# Patient Record
Sex: MALE | Race: WHITE | NOT HISPANIC OR LATINO | Employment: UNEMPLOYED | URBAN - METROPOLITAN AREA
[De-identification: names, ages, dates, MRNs, and addresses within clinical notes are randomized per-mention and may not be internally consistent; named-entity substitution may affect disease eponyms.]

---

## 2019-04-03 ENCOUNTER — HOSPITAL ENCOUNTER (EMERGENCY)
Facility: HOSPITAL | Age: 29
Discharge: PSYCHIATRIC OR SUBSTANCE USE FACILITY | End: 2019-04-04
Attending: EMERGENCY MEDICINE
Payer: MEDICAID

## 2019-04-03 DIAGNOSIS — T50.904S DRUG OVERDOSE, UNDETERMINED INTENT, SEQUELA: Primary | ICD-10-CM

## 2019-04-03 PROCEDURE — 99284 EMERGENCY DEPT VISIT MOD MDM: CPT | Mod: ,,, | Performed by: EMERGENCY MEDICINE

## 2019-04-03 PROCEDURE — 99285 EMERGENCY DEPT VISIT HI MDM: CPT

## 2019-04-03 PROCEDURE — 99284 PR EMERGENCY DEPT VISIT,LEVEL IV: ICD-10-PCS | Mod: ,,, | Performed by: EMERGENCY MEDICINE

## 2019-04-04 VITALS
SYSTOLIC BLOOD PRESSURE: 112 MMHG | HEART RATE: 63 BPM | OXYGEN SATURATION: 100 % | TEMPERATURE: 98 F | HEIGHT: 75 IN | RESPIRATION RATE: 16 BRPM | WEIGHT: 165 LBS | DIASTOLIC BLOOD PRESSURE: 63 MMHG | BODY MASS INDEX: 20.51 KG/M2

## 2019-04-04 PROBLEM — T40.601A OPIATE OVERDOSE: Status: ACTIVE | Noted: 2019-04-04

## 2019-04-04 LAB
ALBUMIN SERPL BCP-MCNC: 3.8 G/DL (ref 3.5–5.2)
ALP SERPL-CCNC: 104 U/L (ref 55–135)
ALT SERPL W/O P-5'-P-CCNC: 10 U/L (ref 10–44)
AMPHET+METHAMPHET UR QL: NEGATIVE
ANION GAP SERPL CALC-SCNC: 8 MMOL/L (ref 8–16)
APAP SERPL-MCNC: <3 UG/ML (ref 10–20)
AST SERPL-CCNC: 14 U/L (ref 10–40)
BACTERIA #/AREA URNS AUTO: ABNORMAL /HPF
BARBITURATES UR QL SCN>200 NG/ML: NEGATIVE
BASOPHILS # BLD AUTO: 0.04 K/UL (ref 0–0.2)
BASOPHILS NFR BLD: 0.3 % (ref 0–1.9)
BENZODIAZ UR QL SCN>200 NG/ML: NEGATIVE
BILIRUB SERPL-MCNC: 0.3 MG/DL (ref 0.1–1)
BILIRUB UR QL STRIP: NEGATIVE
BUN SERPL-MCNC: 14 MG/DL (ref 6–20)
BZE UR QL SCN: NEGATIVE
CALCIUM SERPL-MCNC: 8.8 MG/DL (ref 8.7–10.5)
CANNABINOIDS UR QL SCN: NORMAL
CHLORIDE SERPL-SCNC: 102 MMOL/L (ref 95–110)
CLARITY UR REFRACT.AUTO: CLEAR
CO2 SERPL-SCNC: 24 MMOL/L (ref 23–29)
COLOR UR AUTO: YELLOW
CREAT SERPL-MCNC: 0.9 MG/DL (ref 0.5–1.4)
CREAT UR-MCNC: 322 MG/DL (ref 23–375)
DIFFERENTIAL METHOD: ABNORMAL
EOSINOPHIL # BLD AUTO: 0.1 K/UL (ref 0–0.5)
EOSINOPHIL NFR BLD: 0.4 % (ref 0–8)
ERYTHROCYTE [DISTWIDTH] IN BLOOD BY AUTOMATED COUNT: 12.2 % (ref 11.5–14.5)
EST. GFR  (AFRICAN AMERICAN): >60 ML/MIN/1.73 M^2
EST. GFR  (NON AFRICAN AMERICAN): >60 ML/MIN/1.73 M^2
ETHANOL SERPL-MCNC: <10 MG/DL
GLUCOSE SERPL-MCNC: 99 MG/DL (ref 70–110)
GLUCOSE UR QL STRIP: NEGATIVE
HCT VFR BLD AUTO: 31.8 % (ref 40–54)
HGB BLD-MCNC: 10.5 G/DL (ref 14–18)
HGB UR QL STRIP: NEGATIVE
IMM GRANULOCYTES # BLD AUTO: 0.04 K/UL (ref 0–0.04)
IMM GRANULOCYTES NFR BLD AUTO: 0.3 % (ref 0–0.5)
KETONES UR QL STRIP: ABNORMAL
LEUKOCYTE ESTERASE UR QL STRIP: NEGATIVE
LYMPHOCYTES # BLD AUTO: 3 K/UL (ref 1–4.8)
LYMPHOCYTES NFR BLD: 24.4 % (ref 18–48)
MCH RBC QN AUTO: 31 PG (ref 27–31)
MCHC RBC AUTO-ENTMCNC: 33 G/DL (ref 32–36)
MCV RBC AUTO: 94 FL (ref 82–98)
METHADONE UR QL SCN>300 NG/ML: NEGATIVE
MICROSCOPIC COMMENT: ABNORMAL
MONOCYTES # BLD AUTO: 0.8 K/UL (ref 0.3–1)
MONOCYTES NFR BLD: 6.7 % (ref 4–15)
NEUTROPHILS # BLD AUTO: 8.4 K/UL (ref 1.8–7.7)
NEUTROPHILS NFR BLD: 67.9 % (ref 38–73)
NITRITE UR QL STRIP: NEGATIVE
NRBC BLD-RTO: 0 /100 WBC
OPIATES UR QL SCN: NORMAL
PCP UR QL SCN>25 NG/ML: NEGATIVE
PH UR STRIP: 5 [PH] (ref 5–8)
PLATELET # BLD AUTO: 289 K/UL (ref 150–350)
PMV BLD AUTO: 9.7 FL (ref 9.2–12.9)
POTASSIUM SERPL-SCNC: 4 MMOL/L (ref 3.5–5.1)
PROT SERPL-MCNC: 6.2 G/DL (ref 6–8.4)
PROT UR QL STRIP: NEGATIVE
RBC # BLD AUTO: 3.39 M/UL (ref 4.6–6.2)
RBC #/AREA URNS AUTO: 2 /HPF (ref 0–4)
SODIUM SERPL-SCNC: 134 MMOL/L (ref 136–145)
SP GR UR STRIP: 1.02 (ref 1–1.03)
SQUAMOUS #/AREA URNS AUTO: 0 /HPF
TOXICOLOGY INFORMATION: NORMAL
TSH SERPL DL<=0.005 MIU/L-ACNC: 1.89 UIU/ML (ref 0.4–4)
URN SPEC COLLECT METH UR: ABNORMAL
WBC # BLD AUTO: 12.38 K/UL (ref 3.9–12.7)
WBC #/AREA URNS AUTO: 5 /HPF (ref 0–5)

## 2019-04-04 PROCEDURE — 84443 ASSAY THYROID STIM HORMONE: CPT

## 2019-04-04 PROCEDURE — 80307 DRUG TEST PRSMV CHEM ANLYZR: CPT

## 2019-04-04 PROCEDURE — 80329 ANALGESICS NON-OPIOID 1 OR 2: CPT

## 2019-04-04 PROCEDURE — 85025 COMPLETE CBC W/AUTO DIFF WBC: CPT

## 2019-04-04 PROCEDURE — 80053 COMPREHEN METABOLIC PANEL: CPT

## 2019-04-04 PROCEDURE — 80320 DRUG SCREEN QUANTALCOHOLS: CPT

## 2019-04-04 PROCEDURE — 81001 URINALYSIS AUTO W/SCOPE: CPT

## 2019-04-04 NOTE — ED PROVIDER NOTES
"Encounter Date: 4/3/2019       History     Chief Complaint   Patient presents with    Drug Overdose     Pt's mother called EMS when she suspected pt OD. Upon EMS arrival pt had pinpoint pupils, snoring, reponsive to sternal rub. Pt received .5 narcan, pt GCS 15, awake and alert. Pt has mandible wired shut, reports taking 4 percocets today. Pt seen at  last week for same thing, took 8 percocets at that time.      Mr. Salguero is a 29 yo male who presents to the ED after a drug overdose. Patient mother states she called EMS when she found patient unresponsive on the floor. Patient states that he took "something bad" today in addition to 5 percocet tabs. Patient states that he wasn't trying to harm himself but he has restless legs and shakes when he is withdrawing so he took the pills to help out. He had jaw surgery 2 weeks ago and has his jaw stitched shut since then. Family report that this is the 3rd overdose in 2 weeks. He had to be revived with Narcan in all occasions. He states he has been using oxycodone for the past 3 years and marijuana, he denies use of heroin and cocaine. Mother believes this be a "cry for help" from the patient seeing as he has been doing drugs for 3 years and now has an increased frequency of overdose.        Review of patient's allergies indicates:  No Known Allergies  History reviewed. No pertinent past medical history.  History reviewed. No pertinent surgical history.  History reviewed. No pertinent family history.  Social History     Tobacco Use    Smoking status: Light Tobacco Smoker     Types: Cigarettes    Smokeless tobacco: Never Used   Substance Use Topics    Alcohol use: Not Currently    Drug use: Yes     Types: Marijuana     Review of Systems   Constitutional: Negative for appetite change and fever.   HENT: Negative for congestion and sinus pain.    Eyes: Negative for photophobia and visual disturbance.   Respiratory: Negative for apnea and chest tightness.  "   Cardiovascular: Negative for chest pain and palpitations.   Gastrointestinal: Negative for abdominal pain, diarrhea, nausea and vomiting.   Genitourinary: Negative for difficulty urinating and dysuria.   Musculoskeletal: Negative for arthralgias and back pain.   Skin: Negative for color change and pallor.   Neurological: Negative for dizziness, numbness and headaches.   Psychiatric/Behavioral: Positive for behavioral problems and self-injury (over-dose). Negative for agitation.       Physical Exam     Initial Vitals [04/03/19 1925]   BP Pulse Resp Temp SpO2   (!) 150/80 (!) 116 16 98.6 °F (37 °C) 98 %      MAP       --         Physical Exam    Constitutional: He appears well-developed and well-nourished. He is not diaphoretic. No distress.   HENT:   Head: Normocephalic and atraumatic.   Eyes: Conjunctivae are normal. Pupils are equal, round, and reactive to light.   Cardiovascular: Normal rate, regular rhythm, normal heart sounds and intact distal pulses.   No murmur heard.  Pulmonary/Chest: Breath sounds normal. He has no wheezes.   Abdominal: Soft. Bowel sounds are normal. He exhibits no distension. There is no tenderness.   Musculoskeletal: Normal range of motion. He exhibits no tenderness.   Neurological: He is alert and oriented to person, place, and time.   Skin: Skin is warm. No rash noted.   Psychiatric: He has a normal mood and affect. His speech is normal and behavior is normal. He expresses inappropriate judgment.         ED Course   Procedures  Labs Reviewed - No data to display       Imaging Results    None          Medical Decision Making:   Differential Diagnosis:   Drug over dose  Depression  ED Management:  - CBC, CMP, UA drug screen, Ethanol, TSH   - Psychiatry consulted               Attending Note:  I have seen the patient, have repeated the key portions of the history and physical, reviewed and agree with the medical documentation, and supervised and managed the medical care of the patient.  Additionally, I was present for the critical portion of any procedure(s) performed.    Psychiatry consult and felt patient was appropriate for inpatient psychiatric care.  PEC filled out.    Addendum 04/20/2019 0010   for completion based on  enquiry:     Labs show no acute findings, pt medically clear.         Clinical Impression:       ICD-10-CM ICD-9-CM   1. Drug overdose, undetermined intent, sequela T50.904S 909.0     E989                                Jean Lennon MD  04/05/19 1733       Jean Lennon MD  04/20/19 0010

## 2019-04-04 NOTE — ASSESSMENT & PLAN NOTE
Pt is a 28yoM w/ PPHx of anxiety who presented to [facility] ED for opiate overdose.  Per collateral, this is pt's third overdose in two weeks.  Today, pt found unresponsive by his mother, responded to naloxone.  Pt denies any suicidal intent, denies any history of suicidal ideation or gesture.  Pt voices interest in substance use treatment.  Per collateral, pt very resistant to treatment.  No prolonged periods of sobriety reported.  Pt socially isolated (no job, no friends, no hobbies, no money).  Pt's substance use appears to be escalating over the past two wks.  Pt has minimal insight into the severity of his behaviors.  Pt is currently gravely disabled, would recommend to enact PEC.  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      Dx:   Opiate overdose with unclear intent  Opioid use disorder, severe    Recommendations:   - Recommend to enact PEC if one is not already in place for grave disability  - Recommend to seek inpatient psychiatric hospitalization when/if medically cleared  - Per acute psychiatric unit charge nurse, pt not appropriate for the psychiatric unit due to his jaw wiring, will need to seek placement elsewhere  - Please allow pt to remain in ED while placement is sought  - No scheduled medications recommended, supportive care for anticipated opioid withdrawal symptoms  - Medical workup per ED MD, defer non psychiatric medications    Case discussed with ED MD and psychiatry staff on call Dr. Estrada.  Thank you for the consult, will continue to perform assessments while pt remains in the ED.

## 2019-04-04 NOTE — ED NOTES
Faxed clinical packet to [[facility]] [facility], [facility] ([facility]), [facility], [facility] (Central Intake), [facility] (Central Intake), [[facility]] [facility], [facility]. Awaiting response at this time.

## 2019-04-04 NOTE — ED NOTES
Pt remains in paper scrubs, sleeping in stretcher comfortably. No signs of distress noted. Sitter remains at bedside in direct visual contact, charting per protocol every 15 minutes. No equipment or belongings are in the patients room. Pt aware of plan of care. Will continue to monitor.

## 2019-04-04 NOTE — ED NOTES
"Spoke with Boris at [facility] they denied patient due to Jaw surgery "Unable to give pt the nutrients he needs with his jaw wired shut."  "

## 2019-04-04 NOTE — PROVIDER PROGRESS NOTES - EMERGENCY DEPT.
Encounter Date: 4/3/2019    ED Physician Progress Notes        Physician Note:   Signed out to me pending placement.    Has already been medically cleared and my arrival.    Seen by psych and recommends inpatient psych treatment.   Repeat vitals are stable.   Patient accepted outside psych facility.  Pending transfer.

## 2019-04-04 NOTE — ED NOTES
CPT staff actively seeking psych placement. Faxed clinical packet to [facility], [facility], [facility] ([facility]), & [facility] ([facility]). Awaiting response at this time.
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
## 2019-04-04 NOTE — ED NOTES
Patient accepted by Zeina at [facility] ([address], [place]) for the service of Dr. Griffin.  Report to be called to [phone], option 2.

## 2019-04-04 NOTE — ED NOTES
PEC received in Centralized Placement.  Please ask the physician to sign their notes with H&P and medical clearance to help facilitate placement.

## 2019-04-04 NOTE — ED NOTES
Zeina with [facility] states they can take patient with [facility] pending discharges on their unit. Bed should become available at 10:00AM. Zeina will call CPT with Info (Doctor who accepted and phone number to call report) once bed becomes available..

## 2019-04-04 NOTE — ED NOTES
Pt resting in bed with sitter at bedside. No acute distress noted. Pt aware of pending transport status. Denies complaint

## 2019-04-04 NOTE — PROVIDER PROGRESS NOTES - EMERGENCY DEPT.
Encounter Date: 4/3/2019    ED Physician Progress Notes        Physician Note:   Placement found.  Pending transfer.

## 2019-04-04 NOTE — ED NOTES
Pt arousable and oriented. Discussed transfer status. Pt verbalizes understanding. Pt denies pain or SOB.

## 2019-04-04 NOTE — SUBJECTIVE & OBJECTIVE
Patient History           Medical as of 4/4/2019    Past Medical History: Patient provided no pertinent medical history.           Surgical as of 4/4/2019    Past Surgical History: Patient provided no pertinent surgical history.           Family as of 4/4/2019    None           Tobacco Use as of 4/4/2019     Smoking Status Smoking Start Date Smoking Quit Date Packs/Day Years Used    Light Tobacco Smoker -- -- -- --    Types Comments Smokeless Tobacco Status Smokeless Tobacco Quit Date Source     Cigarettes -- Never Used -- Provider            Alcohol Use as of 4/4/2019     Alcohol Use Drinks/Week Alcohol/Week Comments Source    Not Currently -- -- -- Provider    Frequency Standard Drinks Binge Drinking        -- -- --              Drug Use as of 4/4/2019     Drug Use Types Frequency Comments Source    Yes  Marijuana -- -- Provider            Sexual Activity as of 4/4/2019     Sexually Active Birth Control Partners Comments Source    -- -- -- -- Provider            Activities of Daily Living as of 4/4/2019    None           Social Documentation as of 4/4/2019    None           Occupational as of 4/4/2019    None           Socioeconomic as of 4/4/2019     Marital Status Spouse Name Number of Children Years Education Education Level Preferred Language Ethnicity Race Source    Single -- -- -- -- Unknown Unknown White --    Financial Resource Strain Food Insecurity: Worry Food Insecurity: Inability Transportation Needs: Medical Transportation Needs: Non-medical    -- -- -- -- --            Pertinent History     Question Response Comments    Lives with -- --    Place in Birth Order -- --    Lives in -- --    Number of Siblings -- --    Raised by -- --    Legal Involvement -- --    Childhood Trauma -- --    Criminal History of -- --    Financial Status -- --    Highest Level of Education -- --    Does patient have access to a firearm? -- --     Service -- --    Primary Leisure Activity -- --    Spirituality --  "--        History reviewed. No pertinent past medical history.  History reviewed. No pertinent surgical history.  Family History     None        Tobacco Use    Smoking status: Light Tobacco Smoker     Types: Cigarettes    Smokeless tobacco: Never Used   Substance and Sexual Activity    Alcohol use: Not Currently    Drug use: Yes     Types: Marijuana    Sexual activity: Not on file     Review of patient's allergies indicates:  No Known Allergies    No current facility-administered medications on file prior to encounter.      No current outpatient medications on file prior to encounter.     Psychotherapeutics (From admission, onward)    None        Review of Systems  Strengths and Liabilities: Strength: Patient is expressive/articulate., Strength: Patient is physically healthy., Strength: Patient has positive support network., Liability: Patient is dependent., Liability: Patient is impulsive., Liability: Patient lacks coping skills.    Objective:     Vital Signs (Most Recent):  Temp: 98.6 °F (37 °C) (04/03/19 1925)  Pulse: (!) 116 (04/03/19 1925)  Resp: 16 (04/03/19 1925)  BP: (!) 150/80 (04/03/19 1925)  SpO2: 98 % (04/03/19 1925) Vital Signs (24h Range):  Temp:  [98.6 °F (37 °C)] 98.6 °F (37 °C)  Pulse:  [116] 116  Resp:  [16] 16  SpO2:  [98 %] 98 %  BP: (150)/(80) 150/80     Height: 6' 3" (190.5 cm)  Weight: 74.8 kg (165 lb)  Body mass index is 20.62 kg/m².    No intake or output data in the 24 hours ending 04/04/19 0241    Physical Exam   Psychiatric:   Mental Status Exam:  Appearance: unremarkable, age appropriate, lying in bed, tattoos  Level of Consciousness: awake, alert  Behavior/Cooperation: friendly and cooperative  Psychomotor: psychomotor agitation   Speech: normal tone, normal rate, normal pitch, normal volume  Language: english, fluid  Orientation: person, place, day of week, month of year, year  Attention Span/Concentration: spelled "WORLD" forwards and backwards  Memory: Registers and recalls 3/3 " "objects at 0 and 5 minutes  Mood: "anxious"  Affect: constricted  Thought Process: linear, goal-directed  Associations: normal and logical  Thought Content: normal, no suicidality, no homicidality, delusions, or paranoia  Fund of Knowledge: Aware of current events  Abstraction: proverbs were abstract, similarities were abstract  Insight: poor  Judgment: poor          Significant Labs: All pertinent labs within the past 24 hours have been reviewed.    Significant Imaging: I have reviewed all pertinent imaging results/findings within the past 24 hours.  "

## 2019-04-04 NOTE — ED NOTES
Shun with [facility] and Alesia with [facility] denied patient due to jaw surgery.

## 2019-04-04 NOTE — ED NOTES
"Informed pt of continued placement efforts. When asked, pt stated "jaw wired week and a half ago".   "

## 2019-04-04 NOTE — ED TRIAGE NOTES
Ulysses Salguero, a 28 y.o. male presents to the ED w/ complaint of Drug Overdoes by EMS. Pt states his pain was out of control and was having trouble sleeping. Pt denies SI.     Triage note:  Chief Complaint   Patient presents with    Drug Overdose     Pt's mother called EMS when she suspected pt OD. Upon EMS arrival pt had pinpoint pupils, snoring, reponsive to sternal rub. Pt received .5 narcan, pt GCS 15, awake and alert. Pt has mandible wired shut, reports taking 4 percocets today. Pt seen at  last week for same thing, took 8 percocets at that time.      Review of patient's allergies indicates:  No Known Allergies  No past medical history on file.

## 2019-04-04 NOTE — ED NOTES
Patient changed into paper scrubs, personal belongings removed from room, labeled and locked in EMS room. All cords, wires, and garbage cans have been removed from room for patient safety. Patient's mental health rights have been signed and placed in chart. Sitter at bedside to monitor and record patient activities at least every 15 minutes, while maintaining direct visual contact with patient. The sitter has no personal belongings inside the room.

## 2019-04-04 NOTE — ED NOTES
Pt resting comfortably and independently repositioned in stretcher with bed locked in lowest position for safety. NAD noted at this time. Respirations even and unlabored and visible chest rise noted.  Attempted to obtain UA. Pt states he can not use the restroom at this time. Pt instructed to call if assistance is needed. Pt on continuous cardiac, BP, and O2 monitoring. Call light within reach. Family at bedside. No needs at this time. Will continue to monitor.

## 2019-04-04 NOTE — ED NOTES
Pt d/c to [facility] with transport. Cell phone is with the pts mother. All other belongings with the . Pt ambulated to exit
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
## 2019-04-04 NOTE — ED NOTES
Faxed clinical packet to [[place] AREA] [facility], [facility], [facility], [facility], [facility], [facility], [[place] AREA] [facility], [facility], [facility], [facility], [facility] (Central Intake). Awaiting response at this time.

## 2019-04-04 NOTE — ED NOTES
Patient accepted by [facility] ([address], [place]) for the service of Dr. Villarreal. Report to be called to [phone].

## 2019-05-29 ENCOUNTER — OFFICE VISIT (OUTPATIENT)
Dept: URGENT CARE | Facility: CLINIC | Age: 29
End: 2019-05-29
Payer: MEDICAID

## 2019-05-29 VITALS
HEART RATE: 70 BPM | TEMPERATURE: 98 F | RESPIRATION RATE: 16 BRPM | WEIGHT: 175 LBS | OXYGEN SATURATION: 97 % | SYSTOLIC BLOOD PRESSURE: 111 MMHG | BODY MASS INDEX: 22.46 KG/M2 | HEIGHT: 74 IN | DIASTOLIC BLOOD PRESSURE: 63 MMHG

## 2019-05-29 DIAGNOSIS — H57.89 IRRITATION OF LEFT EYE: Primary | ICD-10-CM

## 2019-05-29 PROCEDURE — 99203 PR OFFICE/OUTPT VISIT, NEW, LEVL III, 30-44 MIN: ICD-10-PCS | Mod: S$GLB,,, | Performed by: PHYSICIAN ASSISTANT

## 2019-05-29 PROCEDURE — 99203 OFFICE O/P NEW LOW 30 MIN: CPT | Mod: S$GLB,,, | Performed by: PHYSICIAN ASSISTANT

## 2019-05-29 NOTE — PATIENT INSTRUCTIONS
Please return here or go to the Emergency Department for any concerns or worsening of condition.    Please follow up with your primary care doctor or specialist as needed.    If you  smoke, please stop smoking.

## 2019-05-29 NOTE — PROGRESS NOTES
"Subjective:       Patient ID: Ulysses Salguero is a 29 y.o. male.    Vitals:  height is 6' 2" (1.88 m) and weight is 79.4 kg (175 lb). His oral temperature is 98 °F (36.7 °C). His blood pressure is 111/63 and his pulse is 70. His respiration is 16 and oxygen saturation is 97%.     Chief Complaint: Eye Problem    States he was outside around 1300 when he say a caren of wind blew by and now feels like something is in left eye. States it feels better now, he doesn't feel it anymore.    Eye Pain    The left eye is affected. This is a new problem. The current episode started today. The problem has been resolved. The pain is at a severity of 3/10. Pain severity now: sorness from rubbing his eye to much. There is no known exposure to pink eye. He does not wear contacts. Associated symptoms include eye redness and itching. Pertinent negatives include no blurred vision, eye discharge, double vision, fever, nausea, photophobia or vomiting. Treatments tried: tried to flush it out. The treatment provided mild relief.       Constitution: Negative for chills and fever.   HENT: Negative for congestion and sinus pain.    Eyes: Positive for eye itching and eye redness. Negative for eye trauma, foreign body in eye, eye discharge, eye pain, photophobia, vision loss, double vision, blurred vision and eyelid swelling.   Gastrointestinal: Negative for nausea and vomiting.   Skin: Negative for rash.   Allergic/Immunologic: Negative for seasonal allergies and itching.   Neurological: Negative for headaches.       Objective:      Physical Exam   Constitutional: He appears well-developed and well-nourished. He is active.   HENT:   Head: Normocephalic and atraumatic. Head is without raccoon's eyes and without Mcdonough's sign.   Right Ear: Hearing and external ear normal.   Left Ear: Hearing and external ear normal.   Nose: Nose normal. No nasal deformity. No epistaxis.   Mouth/Throat: Oropharynx is clear and moist and mucous membranes are " normal.   Eyes: Pupils are equal, round, and reactive to light. EOM and lids are normal. Lids are everted and swept, no foreign bodies found. Right eye exhibits no discharge and no exudate. No foreign body present in the right eye. Left eye exhibits no discharge and no exudate. No foreign body present in the left eye. Right conjunctiva is not injected. Right conjunctiva has no hemorrhage. Left conjunctiva is injected. Left conjunctiva has no hemorrhage.   No foreign body seen, fluorescein normal.   Neck: Trachea normal.   Cardiovascular: Intact distal pulses and normal pulses.   Pulmonary/Chest: Effort normal. No stridor. No respiratory distress.   Musculoskeletal:        Cervical back: Normal. He exhibits normal range of motion and no tenderness.   Neurological: He is alert. He has normal strength. He is not disoriented. GCS eye subscore is 4. GCS verbal subscore is 5. GCS motor subscore is 6.   Skin: Skin is warm, dry and intact.   Psychiatric: He has a normal mood and affect. His speech is normal and behavior is normal.   Nursing note and vitals reviewed.      Assessment:       1. Irritation of left eye        Plan:       Patient states foreign body sensation went away while he was in the waiting room.  I did not appreciate any foreign bodies on physical exam.      Irritation of left eye      Patient Instructions   Please return here or go to the Emergency Department for any concerns or worsening of condition.    Please follow up with your primary care doctor or specialist as needed.    If you  smoke, please stop smoking.

## 2020-03-29 ENCOUNTER — OFFICE VISIT (OUTPATIENT)
Dept: URGENT CARE | Facility: CLINIC | Age: 30
End: 2020-03-29
Payer: MEDICAID

## 2020-03-29 VITALS
DIASTOLIC BLOOD PRESSURE: 78 MMHG | HEIGHT: 74 IN | WEIGHT: 175 LBS | SYSTOLIC BLOOD PRESSURE: 120 MMHG | RESPIRATION RATE: 16 BRPM | HEART RATE: 64 BPM | BODY MASS INDEX: 22.46 KG/M2 | TEMPERATURE: 97 F | OXYGEN SATURATION: 98 %

## 2020-03-29 DIAGNOSIS — R21 EXANTHEM: Primary | ICD-10-CM

## 2020-03-29 PROCEDURE — 99214 OFFICE O/P EST MOD 30 MIN: CPT | Mod: S$GLB,,, | Performed by: EMERGENCY MEDICINE

## 2020-03-29 PROCEDURE — 99214 PR OFFICE/OUTPT VISIT, EST, LEVL IV, 30-39 MIN: ICD-10-PCS | Mod: S$GLB,,, | Performed by: EMERGENCY MEDICINE

## 2020-03-29 RX ORDER — DOXYCYCLINE 100 MG/1
TABLET ORAL
Status: ON HOLD | COMMUNITY
Start: 2020-03-27 | End: 2022-06-08 | Stop reason: HOSPADM

## 2020-03-29 RX ORDER — METHYLPREDNISOLONE 4 MG/1
TABLET ORAL
Status: ON HOLD | COMMUNITY
Start: 2020-03-27 | End: 2022-06-08 | Stop reason: HOSPADM

## 2020-03-29 NOTE — PATIENT INSTRUCTIONS
Consider going to [facility] today for further eval/Dermatology eval prn.    Dallas Palmer MD  Go to the Emergency Department for any problems  Call your PCP for follow up next available.    Self-Care for Skin Rashes     Pat your skin dry. Do not rub.     When your skin reacts to a substance your body is sensitive to, it can cause a rash. You can treat most rashes at home by keeping the skin clean and dry. Many rashes may get better on their own within 2 to 3 days. You may need medical attention if your rash itches, drains, or hurts, particularly if the rash is getting worse.  What can cause a skin rash?  · Sun poisoning, caused by too much exposure to the sun  · An irritant or allergic reaction to a certain type of food, plant, or chemical, such as  shellfish, poison ivy, and or cleaning products  · An infection caused by a fungus (ringworm), virus (chickenpox), or bacteria (strep)  · Bites or infestation caused by insects or pests, such as ticks, lice, or mites  · Dry skin, which is often seen during the winter months and in older people  How can I control itching and skin damage?  · Take soothing lukewarm baths in a colloidal oatmeal product. You can buy this at the [employer].  · Do your best not to scratch. Clip fingernails short, especially in young children, to reduce skin damage if scratching does occur.  · Use moisturizing skin lotion instead of scratching your dry skin.  · Use sunscreen whenever going out into direct sun.  · Use only mild cleansing agents whenever possible.  · Wash with mild, nonirritating soap and warm water.  · Wear clothing that breathes, such as cotton shirts or canvas shoes.  · If fluid is seeping from the rash, cover it loosely with clean gauze to absorb the discharge.  · Many rashes are contagious. Prevent the rash from spreading to others by washing your hands often before or after touching others with any skin rash.  Use medicine  · Antihistamines such as diphenhydramine can help control  itching. But use with caution because they can make you drowsy.  · Using over-the-counter hydrocortisone cream on small rashes may help reduce swelling and itching  · Most over-the-counter antifungal medicines can treat athletes foot and many other fungal infections of the skin.  Check with your healthcare provider  Call your healthcare provider if:  · You were told that you have a fungal infection on your skin to make sure you have the correct type of medicine.  · You have questions or concerns about medicines or their side effects.     Call 911  Call 911 if either of these occur:  · Your tongue or lips start to swell  · You have difficulty breathing      Call your healthcare provider  Call your healthcare provider if any of these occur:  · Temperature of more than 101.0°F (38.3°C), or as directed  · Sore throat, a cough, or unusual fatigue  · Red, oozy, or painful rash gets worse. These are signs of infection.  · Rash covers your face, genitals, or most of your body  · Crusty sores or red rings that begin to spread  · You were exposed to someone who has a contagious rash, such as scabies or lice.  · Red bulls-eye rash with a white center (a sign of Lyme disease)  · You were told that you have resistant bacteria (MRSA) on your skin.   Date Last Reviewed: 5/12/2015  © 8951-7528 [employer]. [address] [place] [address], [place] [address]. All rights reserved. This information is not intended as a substitute for professional medical care. Always follow your healthcare professional's instructions.

## 2020-03-29 NOTE — PROGRESS NOTES
"Subjective:       Patient ID: Ulysses Salguero is a 29 y.o. male.    Vitals:  height is 6' 2" (1.88 m) and weight is 79.4 kg (175 lb). His oral temperature is 97.4 °F (36.3 °C). His blood pressure is 120/78 and his pulse is 64. His respiration is 16 and oxygen saturation is 98%.     Chief Complaint: Hand Problem (BILAT HANDS)    Pt states 4 d ago noticed dry redness to back of both hands/wrists/distal forearms, no new soaps/fever/meds/clothes/sore throat, went to , given Rx for doxy and oral steroids, states not improving, NOC.    Hand Pain    The incident occurred 3 to 5 days ago (x5 days). The incident occurred at home. There was no injury mechanism. The pain is present in the right hand and left hand (posterior ). The quality of the pain is described as burning (red, chaffe). The pain does not radiate. The pain is at a severity of 4/10. The pain is mild. The pain has been constant since the incident. Pertinent negatives include no chest pain. The symptoms are aggravated by movement. Treatments tried: steroid, antibiotic. The treatment provided mild relief.       Constitution: Negative for chills, fatigue and fever.   HENT: Negative for congestion and sore throat.    Neck: Negative for painful lymph nodes.   Cardiovascular: Negative for chest pain and leg swelling.   Eyes: Negative for double vision and blurred vision.   Respiratory: Negative for cough and shortness of breath.    Gastrointestinal: Negative for nausea, vomiting and diarrhea.   Genitourinary: Negative for dysuria, frequency and urgency.   Musculoskeletal: Negative for joint pain, joint swelling, muscle cramps and muscle ache.        Bilat hands burning   Skin: Negative for color change, pale and rash.   Allergic/Immunologic: Negative for seasonal allergies.   Neurological: Negative for dizziness, history of vertigo, light-headedness, passing out and headaches.   Hematologic/Lymphatic: Negative for swollen lymph nodes, easy bruising/bleeding and " history of blood clots. Does not bruise/bleed easily.   Psychiatric/Behavioral: Negative for nervous/anxious, sleep disturbance and depression. The patient is not nervous/anxious.        Objective:      Physical Exam   Constitutional: He is oriented to person, place, and time. He appears well-developed and well-nourished.   HENT:   Head: Normocephalic and atraumatic.   Cardiovascular: Normal rate, regular rhythm, normal heart sounds and intact distal pulses.   Pulmonary/Chest: Breath sounds normal.   Musculoskeletal: Normal range of motion.   Neurological: He is alert and oriented to person, place, and time.   Skin:   Dorsum bilat hands/wrists/distal forearms with dry redness, flaky, no open lesions appreciated, no drainage, tender   Psychiatric: He has a normal mood and affect. His behavior is normal.         Assessment:       1. Exanthem        Plan:         Exanthem         Dallas Palmer MD  Go to the Emergency Department for any problems  Call your PCP for follow up next available.

## 2022-06-02 ENCOUNTER — HOSPITAL ENCOUNTER (EMERGENCY)
Facility: HOSPITAL | Age: 32
Discharge: HOME | End: 2022-06-02
Attending: EMERGENCY MEDICINE
Payer: MEDICAID

## 2022-06-02 VITALS
OXYGEN SATURATION: 96 % | WEIGHT: 165 LBS | HEART RATE: 92 BPM | DIASTOLIC BLOOD PRESSURE: 69 MMHG | RESPIRATION RATE: 15 BRPM | BODY MASS INDEX: 21.18 KG/M2 | TEMPERATURE: 99 F | SYSTOLIC BLOOD PRESSURE: 122 MMHG

## 2022-06-02 DIAGNOSIS — M54.50 ACUTE BILATERAL LOW BACK PAIN, UNSPECIFIED WHETHER SCIATICA PRESENT: Primary | ICD-10-CM

## 2022-06-02 PROCEDURE — 99282 PR EMERGENCY DEPT VISIT,LEVEL II: ICD-10-PCS | Mod: ,,, | Performed by: EMERGENCY MEDICINE

## 2022-06-02 PROCEDURE — 99283 EMERGENCY DEPT VISIT LOW MDM: CPT

## 2022-06-02 PROCEDURE — 99282 EMERGENCY DEPT VISIT SF MDM: CPT | Mod: ,,, | Performed by: EMERGENCY MEDICINE

## 2022-06-02 PROCEDURE — 25000003 PHARM REV CODE 250: Performed by: EMERGENCY MEDICINE

## 2022-06-02 RX ORDER — ACETAMINOPHEN 500 MG
1000 TABLET ORAL
Status: COMPLETED | OUTPATIENT
Start: 2022-06-02 | End: 2022-06-02

## 2022-06-02 RX ADMIN — ACETAMINOPHEN 1000 MG: 500 TABLET ORAL at 01:06

## 2022-06-02 NOTE — ED PROVIDER NOTES
"Encounter Date: 6/2/2022       History     Chief Complaint   Patient presents with    Back Pain     Pt c/o lower back pain, body aches, and "feeling cold and warm" x1 week. Unsure if trauma to lower back. Ambulatory in triage.      HPI   33 y/o healthy M with complaint of low back pain. Started 1 week ago and has been increasing in intensity. 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 No weakness, No numbness or tingling. No bowel or bladder problems.  He also notes generalized body aches. No fever. No cough    Review of patient's allergies indicates:  No Known Allergies     PMH: None    Past Surgical History:   Procedure Laterality Date    MANDIBLE FRACTURE SURGERY       No family history on file.  Social History     Tobacco Use    Smoking status: Light Tobacco Smoker     Types: Cigarettes    Smokeless tobacco: Never Used   Substance Use Topics    Alcohol use: Not Currently    Drug use: Yes     Types: Marijuana     Review of Systems   Constitutional: Negative for chills, fatigue and fever.   HENT: Negative for sore throat.    Eyes: Negative for visual disturbance.   Respiratory: Negative for cough and shortness of breath.    Cardiovascular: Negative for chest pain and leg swelling.   Gastrointestinal: Negative for abdominal pain, nausea and vomiting.   Genitourinary: Negative for dysuria.   Musculoskeletal: Positive for back pain. Negative for neck stiffness.   Skin: Negative for rash.   Neurological: Negative for dizziness, weakness, numbness and headaches.       Physical Exam     Initial Vitals [06/02/22 0033]   BP Pulse Resp Temp SpO2   122/69 92 15 99.1 °F (37.3 °C) 96 %      MAP       --         Physical Exam    Nursing note and vitals reviewed.  Constitutional: He appears well-developed and well-nourished. He is not diaphoretic. No distress.   HENT:   Head: Normocephalic and atraumatic.   Eyes: Conjunctivae are normal.   Neck: Neck supple. "   Cardiovascular: Normal rate and regular rhythm.   Pulmonary/Chest: Breath sounds normal. No respiratory distress. He has no wheezes. He has no rales.   Abdominal: Abdomen is soft. He exhibits no distension. There is no abdominal tenderness.   Musculoskeletal:      Cervical back: Neck supple.     Neurological: He is alert and oriented to person, place, and time. He has normal strength. No sensory deficit. GCS score is 15. GCS eye subscore is 4. GCS verbal subscore is 5. GCS motor subscore is 6.   Patellar DTR 2+   Skin: Skin is warm and dry. No rash noted.         ED Course   Procedures  Labs Reviewed - No data to display       Imaging Results    None          Medications   acetaminophen tablet 1,000 mg (1,000 mg Oral Given 6/2/22 0101)     Medical Decision Making:   History:   Old Medical Records: I decided to obtain old medical records.  Initial Assessment:   33 y/o m with low back pain  No trauma  No red flags  No indication for emergent imaging  Bodyaches but well appearing and afebrile  Analgesia  Follow up PCP. Routine return precautions.                      Clinical Impression:   Final diagnoses:  [M54.50] Acute bilateral low back pain, unspecified whether sciatica present (Primary)          ED Disposition Condition    Discharge Stable        ED Prescriptions     None        Follow-up Information     Follow up With Specialties Details Why Contact [facility] Family Medicine Family Medicine Schedule an appointment as soon as possible for a visit   [address] [facility] [address]  [phone]             Elizabeth Gomez MD  06/02/22 0801

## 2022-06-02 NOTE — DISCHARGE INSTRUCTIONS
Take tylenol or motrin if needed for back pain  Follow up with your doctor for referral to physical therapy  Return to ED for weakness, numbness, tingling, bowel or bladder problems or any other concerns

## 2022-06-02 NOTE — ED NOTES
"Pt received to intake 01. Pt complaint of back pain, generalized body pain and fever/chills.      Pt states " My lower back is killing me. It has been going on the last week and just getting worse. I have also been getting a lot of aches and pains with fever and chills. 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 This is the first time it has felt like this"    Pt reports pain mid to lower back and rates pain 10/10 on numerical scale. Pt states " The lower back pain is worse than the mid part."   "

## 2022-06-06 ENCOUNTER — HOSPITAL ENCOUNTER (EMERGENCY)
Facility: HOSPITAL | Age: 32
Discharge: HOME | End: 2022-06-07
Attending: EMERGENCY MEDICINE
Payer: MEDICAID

## 2022-06-06 DIAGNOSIS — F11.10 OPIATE ABUSE, CONTINUOUS: ICD-10-CM

## 2022-06-06 DIAGNOSIS — Z00.8 MEDICAL CLEARANCE FOR PSYCHIATRIC ADMISSION: Primary | ICD-10-CM

## 2022-06-06 LAB
ALBUMIN SERPL BCP-MCNC: 3.1 G/DL (ref 3.5–5.2)
ALP SERPL-CCNC: 118 U/L (ref 55–135)
ALT SERPL W/O P-5'-P-CCNC: 40 U/L (ref 10–44)
AMPHET+METHAMPHET UR QL: NEGATIVE
ANION GAP SERPL CALC-SCNC: 7 MMOL/L (ref 8–16)
APAP SERPL-MCNC: <3 UG/ML (ref 10–20)
AST SERPL-CCNC: 40 U/L (ref 10–40)
BARBITURATES UR QL SCN>200 NG/ML: NEGATIVE
BASOPHILS # BLD AUTO: 0.01 K/UL (ref 0–0.2)
BASOPHILS NFR BLD: 0.2 % (ref 0–1.9)
BENZODIAZ UR QL SCN>200 NG/ML: NEGATIVE
BILIRUB SERPL-MCNC: 0.3 MG/DL (ref 0.1–1)
BILIRUB UR QL STRIP: NEGATIVE
BUN SERPL-MCNC: 17 MG/DL (ref 6–20)
BZE UR QL SCN: NEGATIVE
CALCIUM SERPL-MCNC: 8.6 MG/DL (ref 8.7–10.5)
CANNABINOIDS UR QL SCN: NEGATIVE
CHLORIDE SERPL-SCNC: 104 MMOL/L (ref 95–110)
CLARITY UR REFRACT.AUTO: CLEAR
CO2 SERPL-SCNC: 26 MMOL/L (ref 23–29)
COLOR UR AUTO: COLORLESS
CREAT SERPL-MCNC: 0.7 MG/DL (ref 0.5–1.4)
CREAT UR-MCNC: 5 MG/DL (ref 23–375)
CTP QC/QA: YES
DIFFERENTIAL METHOD: ABNORMAL
EOSINOPHIL # BLD AUTO: 0.2 K/UL (ref 0–0.5)
EOSINOPHIL NFR BLD: 3.7 % (ref 0–8)
ERYTHROCYTE [DISTWIDTH] IN BLOOD BY AUTOMATED COUNT: 12.4 % (ref 11.5–14.5)
EST. GFR  (AFRICAN AMERICAN): >60 ML/MIN/1.73 M^2
EST. GFR  (NON AFRICAN AMERICAN): >60 ML/MIN/1.73 M^2
ETHANOL SERPL-MCNC: <10 MG/DL
GLUCOSE SERPL-MCNC: 96 MG/DL (ref 70–110)
GLUCOSE UR QL STRIP: NEGATIVE
HCT VFR BLD AUTO: 30 % (ref 40–54)
HGB BLD-MCNC: 10.4 G/DL (ref 14–18)
HGB UR QL STRIP: NEGATIVE
IMM GRANULOCYTES # BLD AUTO: 0.01 K/UL (ref 0–0.04)
IMM GRANULOCYTES NFR BLD AUTO: 0.2 % (ref 0–0.5)
KETONES UR QL STRIP: NEGATIVE
LEUKOCYTE ESTERASE UR QL STRIP: NEGATIVE
LYMPHOCYTES # BLD AUTO: 1.3 K/UL (ref 1–4.8)
LYMPHOCYTES NFR BLD: 30.9 % (ref 18–48)
MCH RBC QN AUTO: 31.2 PG (ref 27–31)
MCHC RBC AUTO-ENTMCNC: 34.7 G/DL (ref 32–36)
MCV RBC AUTO: 90 FL (ref 82–98)
METHADONE UR QL SCN>300 NG/ML: NEGATIVE
MONOCYTES # BLD AUTO: 0.7 K/UL (ref 0.3–1)
MONOCYTES NFR BLD: 15.9 % (ref 4–15)
NEUTROPHILS # BLD AUTO: 2.1 K/UL (ref 1.8–7.7)
NEUTROPHILS NFR BLD: 49.1 % (ref 38–73)
NITRITE UR QL STRIP: NEGATIVE
NRBC BLD-RTO: 0 /100 WBC
OPIATES UR QL SCN: ABNORMAL
PCP UR QL SCN>25 NG/ML: NEGATIVE
PH UR STRIP: 7 [PH] (ref 5–8)
PLATELET # BLD AUTO: 200 K/UL (ref 150–450)
PMV BLD AUTO: 10 FL (ref 9.2–12.9)
POTASSIUM SERPL-SCNC: 3.8 MMOL/L (ref 3.5–5.1)
PROT SERPL-MCNC: 5.8 G/DL (ref 6–8.4)
PROT UR QL STRIP: NEGATIVE
RBC # BLD AUTO: 3.33 M/UL (ref 4.6–6.2)
SARS-COV-2 RDRP RESP QL NAA+PROBE: NEGATIVE
SODIUM SERPL-SCNC: 137 MMOL/L (ref 136–145)
SP GR UR STRIP: 1 (ref 1–1.03)
TOXICOLOGY INFORMATION: ABNORMAL
TSH SERPL DL<=0.005 MIU/L-ACNC: 0.84 UIU/ML (ref 0.4–4)
URN SPEC COLLECT METH UR: ABNORMAL
WBC # BLD AUTO: 4.33 K/UL (ref 3.9–12.7)

## 2022-06-06 PROCEDURE — 99284 PR EMERGENCY DEPT VISIT,LEVEL IV: ICD-10-PCS | Mod: CS,,, | Performed by: PHYSICIAN ASSISTANT

## 2022-06-06 PROCEDURE — 86803 HEPATITIS C AB TEST: CPT | Performed by: EMERGENCY MEDICINE

## 2022-06-06 PROCEDURE — 85025 COMPLETE CBC W/AUTO DIFF WBC: CPT | Performed by: PHYSICIAN ASSISTANT

## 2022-06-06 PROCEDURE — 81003 URINALYSIS AUTO W/O SCOPE: CPT | Mod: 59 | Performed by: PHYSICIAN ASSISTANT

## 2022-06-06 PROCEDURE — 99285 EMERGENCY DEPT VISIT HI MDM: CPT | Mod: 25

## 2022-06-06 PROCEDURE — U0002 COVID-19 LAB TEST NON-CDC: HCPCS | Performed by: PHYSICIAN ASSISTANT

## 2022-06-06 PROCEDURE — 84443 ASSAY THYROID STIM HORMONE: CPT | Performed by: PHYSICIAN ASSISTANT

## 2022-06-06 PROCEDURE — 80053 COMPREHEN METABOLIC PANEL: CPT | Performed by: PHYSICIAN ASSISTANT

## 2022-06-06 PROCEDURE — 99284 EMERGENCY DEPT VISIT MOD MDM: CPT | Mod: CS,,, | Performed by: PHYSICIAN ASSISTANT

## 2022-06-06 PROCEDURE — 80307 DRUG TEST PRSMV CHEM ANLYZR: CPT | Performed by: PHYSICIAN ASSISTANT

## 2022-06-06 PROCEDURE — 87389 HIV-1 AG W/HIV-1&-2 AB AG IA: CPT | Performed by: EMERGENCY MEDICINE

## 2022-06-06 PROCEDURE — 82077 ASSAY SPEC XCP UR&BREATH IA: CPT | Performed by: PHYSICIAN ASSISTANT

## 2022-06-06 PROCEDURE — 80143 DRUG ASSAY ACETAMINOPHEN: CPT | Performed by: PHYSICIAN ASSISTANT

## 2022-06-06 NOTE — ED NOTES
"Patient identifiers verified and correct for Ulysses Salguero   C/C: Pt states "Nothing is going on. I feel perfectly fine. My mom told me I need to go to the hospital."  APPEARANCE: awake and alert in no acute distress.  SKIN: warm, dry and intact. No breakdown or bruising.  MUSCULOSKELETAL: Patient moving all extremities spontaneously, no obvious swelling or deformities noted. Ambulates independently.  RESPIRATORY: Denies shortness of breath. Respirations unlabored.   CARDIAC: Denies CP, 2+ distal pulses; no pripheral edema  ABDOMEN: soft, non-tender, and non-distended, Denies N/V  : voids spontaneously, denies difficulty  Neurologic: AAO x 4; follows commands equal strength in all extremities; denies numbness/tingling. Denies dizziness   "

## 2022-06-06 NOTE — ED TRIAGE NOTES
"Pt states "Nothing is going on. I feel perfectly fine. My mom told me I need to go to the hospital."  "

## 2022-06-07 VITALS
HEIGHT: 75 IN | TEMPERATURE: 98 F | BODY MASS INDEX: 20.51 KG/M2 | OXYGEN SATURATION: 100 % | RESPIRATION RATE: 16 BRPM | DIASTOLIC BLOOD PRESSURE: 80 MMHG | WEIGHT: 165 LBS | SYSTOLIC BLOOD PRESSURE: 123 MMHG | HEART RATE: 46 BPM

## 2022-06-07 PROBLEM — F11.20 UNCOMPLICATED OPIOID DEPENDENCE: Status: ACTIVE | Noted: 2022-06-07

## 2022-06-07 LAB
HCV AB SERPL QL IA: NEGATIVE
HIV 1+2 AB+HIV1 P24 AG SERPL QL IA: NEGATIVE
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
## 2022-06-07 NOTE — ED PROVIDER NOTES
Encounter Date: 6/6/2022       History     Chief Complaint   Patient presents with    Psychiatric Evaluation     Arrive with bjorn, ++OPC     32-year-old male with past medical history of opiate abuse presents to the emergency department with OPC followed by patient's mother.  She reports that patient is not eating, sleeping, or caring for herself.  She reports that he is delusional and is abusing opiates.  She states that he uses heroin and fentanyl.   Patient declines these accusations.  He denies SI, HI, auditory or visual hallucinations.   He states that he takes oxycodone and suspects that he was accidentally poisoned with fentanyl a few nights ago.  He suspects this is what his mother is referring to and why she filed the OPC.  He additionally complains of bilateral lower extremity swelling that has been ongoing for several weeks.  Seems to worsen when he is on his feet for long periods of time.  He denies any other somatic complaints.        Review of patient's allergies indicates:  No Known Allergies  No past medical history on file.  Past Surgical History:   Procedure Laterality Date    MANDIBLE FRACTURE SURGERY       No family history on file.  Social History     Tobacco Use    Smoking status: Light Tobacco Smoker     Types: Cigarettes    Smokeless tobacco: Never Used   Substance Use Topics    Alcohol use: Not Currently    Drug use: Yes     Types: Marijuana     Review of Systems   Constitutional: Negative for fever.   HENT: Negative for sore throat.    Respiratory: Negative for shortness of breath.    Cardiovascular: Positive for leg swelling. Negative for chest pain.   Gastrointestinal: Negative for nausea.   Genitourinary: Negative for dysuria.   Musculoskeletal: Negative for back pain.   Skin: Negative for rash.   Neurological: Negative for weakness.   Hematological: Does not bruise/bleed easily.       Physical Exam     Initial Vitals [06/06/22 1503]   BP Pulse Resp Temp SpO2   134/84 (!) 58 18  98.5 °F (36.9 °C) 100 %      MAP       --         Physical Exam    Nursing note and vitals reviewed.  Constitutional: He appears well-developed and well-nourished. He is not diaphoretic. No distress.   HENT:   Head: Normocephalic and atraumatic.   Mouth/Throat: Oropharynx is clear and moist.   Eyes: EOM are normal. Pupils are equal, round, and reactive to light.   Neck: Neck supple.   Normal range of motion.  Cardiovascular: Normal rate, regular rhythm, normal heart sounds and intact distal pulses. Exam reveals no gallop and no friction rub.    No murmur heard.  Pulmonary/Chest: Breath sounds normal. He has no wheezes. He has no rhonchi. He has no rales.   Abdominal: Abdomen is soft. Bowel sounds are normal. There is no abdominal tenderness.   Musculoskeletal:         General: Edema present. Normal range of motion.      Cervical back: Normal range of motion and neck supple.      Comments: Trace edema bilaterally     Neurological: He is alert and oriented to person, place, and time. He has normal strength. GCS score is 15. GCS eye subscore is 4. GCS verbal subscore is 5. GCS motor subscore is 6.   Skin: Skin is warm and dry. Capillary refill takes less than 2 seconds.   Psychiatric: His speech is normal. Judgment and thought content normal. His mood appears anxious. He is withdrawn. He expresses no suicidal ideation. He expresses no suicidal plans.         ED Course   Procedures  Labs Reviewed   CBC W/ AUTO DIFFERENTIAL - Abnormal; Notable for the following components:       Result Value    RBC 3.33 (*)     Hemoglobin 10.4 (*)     Hematocrit 30.0 (*)     MCH 31.2 (*)     Mono % 15.9 (*)     All other components within normal limits   COMPREHENSIVE METABOLIC PANEL - Abnormal; Notable for the following components:    Calcium 8.6 (*)     Total Protein 5.8 (*)     Albumin 3.1 (*)     Anion Gap 7 (*)     All other components within normal limits   URINALYSIS, REFLEX TO URINE CULTURE - Abnormal; Notable for the following  components:    Color, UA Colorless (*)     Specific [place], UA 1.000 (*)     All other components within normal limits    Narrative:     Specimen Source->Urine   DRUG SCREEN PANEL, URINE EMERGENCY - Abnormal; Notable for the following components:    Opiate Scrn, Ur Presumptive Positive (*)     Creatinine, Urine 5.0 (*)     All other components within normal limits    Narrative:     Specimen Source->Urine   ACETAMINOPHEN LEVEL - Abnormal; Notable for the following components:    Acetaminophen (Tylenol), Serum <3.0 (*)     All other components within normal limits   TSH   ALCOHOL,MEDICAL (ETHANOL)   HIV 1 / 2 ANTIBODY   HEPATITIS C ANTIBODY   SARS-COV-2 RDRP GENE          Imaging Results    None          Medications - No data to display  Medical Decision Making:   History:   Old Medical Records: I decided to obtain old medical records.  Initial Assessment:   Emergent evaluation of a 32-year-old male who presents to the emergency department with OPC filed by patient's mother.  Patient denies these accusations.  Patient is afebrile, hemodynamically stable, nontoxic appearing.  Will order psychiatric clearance labs and consult Psychiatry.  Differential Diagnosis:     Differential diagnosis includes but is not limited to drug induced psychosis, suicidal ideation, depression, anxiety, talib.   Clinical Tests:   Lab Tests: Ordered and Reviewed  ED Management:   No significant metabolic or hematologic derangements.  Ethanol negative.  Acetaminophen negative.  TSH within normal limits.  UA without infection.  Drug screen presumptive positive for opiates.  Disposition pending psychiatric evaluation.    The patient's history, physical exam, and plan of care was discussed with and agreed upon with my supervising physician.               Attending Attestation:     Physician Attestation Statement for NP/PA:   I have conducted a face to face encounter with this patient in addition to the NP/PA, due to Medical Complexity    Other  NP/PA Attestation Additions:      Medical Decision Makin yo male presenting for psychiatric evaluation.  OPC by mother for failure to care for self.  Patient denies pain, non-focal neuro exam.   Psychiatry evaluated patient in ED, agree with PEC for grave disability.  Medically cleared for transfer to inpatient psychiatric unit.                  Medically cleared for psychiatry placement: 2022  9:16 PM    Clinical Impression:   Final diagnoses:  [Z00.8] Medical clearance for psychiatric admission (Primary)  [F11.10] Opiate abuse, continuous          ED Disposition Condition    Transfer to Psych Facility         ED Prescriptions     None        Follow-up Information    None          Nathalia Dumont PA-C  [phone]       Juan Jose Velazquez MD  [phone]

## 2022-06-15 ENCOUNTER — HOSPITAL ENCOUNTER (EMERGENCY)
Facility: HOSPITAL | Age: 32
Discharge: HOME | End: 2022-06-15
Attending: EMERGENCY MEDICINE
Payer: MEDICAID

## 2022-06-15 VITALS
SYSTOLIC BLOOD PRESSURE: 116 MMHG | OXYGEN SATURATION: 98 % | HEART RATE: 58 BPM | BODY MASS INDEX: 21.14 KG/M2 | WEIGHT: 170 LBS | DIASTOLIC BLOOD PRESSURE: 64 MMHG | HEIGHT: 75 IN | RESPIRATION RATE: 18 BRPM | TEMPERATURE: 98 F

## 2022-06-15 DIAGNOSIS — R74.8 ELEVATED LIVER ENZYMES: ICD-10-CM

## 2022-06-15 DIAGNOSIS — R60.0 BILATERAL LEG EDEMA: ICD-10-CM

## 2022-06-15 DIAGNOSIS — L03.116 BILATERAL CELLULITIS OF LOWER LEG: Primary | ICD-10-CM

## 2022-06-15 DIAGNOSIS — L03.115 BILATERAL CELLULITIS OF LOWER LEG: Primary | ICD-10-CM

## 2022-06-15 LAB
ALBUMIN SERPL BCP-MCNC: 3.3 G/DL (ref 3.5–5.2)
ALP SERPL-CCNC: 103 U/L (ref 55–135)
ALT SERPL W/O P-5'-P-CCNC: 104 U/L (ref 10–44)
ANION GAP SERPL CALC-SCNC: 8 MMOL/L (ref 8–16)
AST SERPL-CCNC: 124 U/L (ref 10–40)
BASOPHILS # BLD AUTO: 0.03 K/UL (ref 0–0.2)
BASOPHILS NFR BLD: 0.5 % (ref 0–1.9)
BILIRUB SERPL-MCNC: 0.5 MG/DL (ref 0.1–1)
BUN SERPL-MCNC: 13 MG/DL (ref 6–20)
CALCIUM SERPL-MCNC: 9.1 MG/DL (ref 8.7–10.5)
CHLORIDE SERPL-SCNC: 99 MMOL/L (ref 95–110)
CO2 SERPL-SCNC: 30 MMOL/L (ref 23–29)
CREAT SERPL-MCNC: 0.6 MG/DL (ref 0.5–1.4)
CRP SERPL-MCNC: 36.2 MG/L (ref 0–8.2)
DIFFERENTIAL METHOD: ABNORMAL
EOSINOPHIL # BLD AUTO: 0.3 K/UL (ref 0–0.5)
EOSINOPHIL NFR BLD: 4.4 % (ref 0–8)
ERYTHROCYTE [DISTWIDTH] IN BLOOD BY AUTOMATED COUNT: 12.6 % (ref 11.5–14.5)
EST. GFR  (AFRICAN AMERICAN): >60 ML/MIN/1.73 M^2
EST. GFR  (NON AFRICAN AMERICAN): >60 ML/MIN/1.73 M^2
GLUCOSE SERPL-MCNC: 93 MG/DL (ref 70–110)
HCT VFR BLD AUTO: 33.3 % (ref 40–54)
HGB BLD-MCNC: 11.2 G/DL (ref 14–18)
IMM GRANULOCYTES # BLD AUTO: 0.01 K/UL (ref 0–0.04)
IMM GRANULOCYTES NFR BLD AUTO: 0.2 % (ref 0–0.5)
LYMPHOCYTES # BLD AUTO: 1.4 K/UL (ref 1–4.8)
LYMPHOCYTES NFR BLD: 25.3 % (ref 18–48)
MCH RBC QN AUTO: 30.6 PG (ref 27–31)
MCHC RBC AUTO-ENTMCNC: 33.6 G/DL (ref 32–36)
MCV RBC AUTO: 91 FL (ref 82–98)
MONOCYTES # BLD AUTO: 0.6 K/UL (ref 0.3–1)
MONOCYTES NFR BLD: 10.3 % (ref 4–15)
NEUTROPHILS # BLD AUTO: 3.4 K/UL (ref 1.8–7.7)
NEUTROPHILS NFR BLD: 59.3 % (ref 38–73)
NRBC BLD-RTO: 0 /100 WBC
PLATELET # BLD AUTO: 282 K/UL (ref 150–450)
PMV BLD AUTO: 9.8 FL (ref 9.2–12.9)
POTASSIUM SERPL-SCNC: 3.8 MMOL/L (ref 3.5–5.1)
PROT SERPL-MCNC: 6.2 G/DL (ref 6–8.4)
RBC # BLD AUTO: 3.66 M/UL (ref 4.6–6.2)
SODIUM SERPL-SCNC: 137 MMOL/L (ref 136–145)
WBC # BLD AUTO: 5.65 K/UL (ref 3.9–12.7)

## 2022-06-15 PROCEDURE — 85025 COMPLETE CBC W/AUTO DIFF WBC: CPT | Performed by: PHYSICIAN ASSISTANT

## 2022-06-15 PROCEDURE — 25000003 PHARM REV CODE 250: Performed by: PHYSICIAN ASSISTANT

## 2022-06-15 PROCEDURE — 99284 PR EMERGENCY DEPT VISIT,LEVEL IV: ICD-10-PCS | Mod: ,,, | Performed by: PHYSICIAN ASSISTANT

## 2022-06-15 PROCEDURE — 86140 C-REACTIVE PROTEIN: CPT | Performed by: PHYSICIAN ASSISTANT

## 2022-06-15 PROCEDURE — 80053 COMPREHEN METABOLIC PANEL: CPT | Performed by: PHYSICIAN ASSISTANT

## 2022-06-15 PROCEDURE — 99284 EMERGENCY DEPT VISIT MOD MDM: CPT | Mod: 25

## 2022-06-15 PROCEDURE — 99284 EMERGENCY DEPT VISIT MOD MDM: CPT | Mod: ,,, | Performed by: PHYSICIAN ASSISTANT

## 2022-06-15 RX ORDER — CEPHALEXIN 500 MG/1
500 CAPSULE ORAL EVERY 6 HOURS
Qty: 28 CAPSULE | Refills: 0 | Status: SHIPPED | OUTPATIENT
Start: 2022-06-15 | End: 2022-06-22

## 2022-06-15 RX ORDER — CEPHALEXIN 500 MG/1
500 CAPSULE ORAL
Status: COMPLETED | OUTPATIENT
Start: 2022-06-15 | End: 2022-06-15

## 2022-06-15 RX ADMIN — CEPHALEXIN 500 MG: 500 CAPSULE ORAL at 03:06

## 2022-06-15 NOTE — ED NOTES
LOC: The patient is awake and alert; oriented x 3 and speaking appropriately.  APPEARANCE: Patient resting comfortably, patient is clean and well groomed  SKIN: warm and dry, normal skin turgor & moist mucus membranes, skin intact, no breakdown noted.Both lower legs swollen and red.   MUSCULOSKELETAL: Patient moving all extremities well, no obvious swelling or deformities noted  RESPIRATORY: Airway is open and patent,  respirations are spontaneous, normal effort and rate  CARDIAC: Patient has a normal rate, no peripheral edema noted, capillary refill < 3 seconds; No complaints of chest pain   ABDOMEN: Soft and non tender to palpation, no distention noted.

## 2022-06-15 NOTE — Clinical Note
"Ulysses Broderickon" Noemy was seen and treated in our emergency department on 6/15/2022.  He may return to work on 06/17/2022.       If you have any questions or concerns, please don't hesitate to call.      Latricia oClvin PA-C"

## 2022-06-15 NOTE — DISCHARGE INSTRUCTIONS
Take keflex every 6 hours for 7 days for cellulitis.   Elevate your legs as much as possible. Wear compression stockings.  Follow up with primary care physician for elevated liver enzymes and bilateral inguinal lymph node enlargement if they do not resolve after you infection.   Return to the ER for new or worsening symptoms.  Imaging Results              US Lower Extremity Veins Bilateral (Final result)  Result time 06/15/22 15:04:50      Final result by Flash Doyle MD (06/15/22 15:04:50)                   Impression:      1. No evidence of deep venous thrombosis in either lower extremity.  2. Prominent bilateral groin lymph nodes.    Electronically signed by resident: Pravez Blackwell  Date:    06/15/2022  Time:    14:24    Electronically signed by: Flash Doyle MD  Date:    06/15/2022  Time:    15:04               Narrative:    EXAMINATION:  US LOWER EXTREMITY VEINS BILATERAL    CLINICAL HISTORY:  Localized edema    TECHNIQUE:  Duplex and color flow Doppler and dynamic compression was performed of the bilateral lower extremity veins was performed.    COMPARISON:  None    FINDINGS:  Right thigh veins: The common femoral, femoral, popliteal, upper greater saphenous, and deep femoral veins are patent and free of thrombus. The veins are normally compressible and have normal phasic flow and augmentation response.    Right calf veins: The visualized calf veins are patent.    Left thigh veins: The common femoral, femoral, popliteal, upper greater saphenous, and deep femoral veins are patent and free of thrombus. The veins are normally compressible and have normal phasic flow and augmentation response.  Left popliteal vein is duplicated.    Left calf veins: The visualized calf veins are patent.    Miscellaneous: Prominent bilateral inguinal lymph nodes without pathologic enlargement.

## 2022-06-15 NOTE — ED PROVIDER NOTES
Encounter Date: 6/15/2022       History     Chief Complaint   Patient presents with    Leg Swelling     Bilateral leg swelling x 1 week. Denies medical hx.     12:50 PM  Patient is a 32 year old male who presents to [facility] ED with bilateral LE edema for more than 1 week.    Patient is here with his mother. She states that patient has had swelling to his R left first, but then essentially both now. He does have some pain to his ankles and feet at times, but states that it is not painful. He states they feel tight. He has noticed some areas of redness. He denies chest pain or SOB. He does not use hormones. He admits to abusing fentanyl (smokes), but denies any IVDU; his mother also attest to this. He was recently admitted to [facility] psych facility for psychosis.         Review of patient's allergies indicates:  No Known Allergies  Past Medical History:   Diagnosis Date    Opiate addiction      Past Surgical History:   Procedure Laterality Date    MANDIBLE FRACTURE SURGERY      MANDIBLE FRACTURE SURGERY       No family history on file.  Social History     Tobacco Use    Smoking status: Light Tobacco Smoker     Types: Cigarettes    Smokeless tobacco: Never Used   Substance Use Topics    Alcohol use: Not Currently    Drug use: Yes     Types: Marijuana     Comment: opiates and fentanyl     Review of Systems   Constitutional: Negative for chills, diaphoresis and fever.   HENT: Negative for sore throat.    Respiratory: Negative for cough and shortness of breath.    Cardiovascular: Positive for leg swelling. Negative for chest pain.   Gastrointestinal: Negative for nausea.   Genitourinary: Negative for dysuria.   Musculoskeletal: Negative for back pain.   Skin: Positive for color change. Negative for rash.   Neurological: Negative for weakness.   Hematological: Does not bruise/bleed easily.       Physical Exam     Initial Vitals [06/15/22 1203]   BP Pulse Resp Temp SpO2   133/63 66 20 98 °F (36.7 °C) 98 %       MAP       --         Physical Exam    Vitals reviewed.  Constitutional: He appears well-developed and well-nourished. He is not diaphoretic. He is cooperative.  Non-toxic appearance. He does not have a sickly appearance. He does not appear ill. No distress. Face mask in place.   HENT:   Head: Normocephalic and atraumatic.   Nose: Nose normal.   Mouth/Throat: No trismus in the jaw.   Eyes: Conjunctivae and EOM are normal.   Neck:   Normal range of motion.  Cardiovascular:   Pulses:       Dorsalis pedis pulses are 2+ on the right side and 2+ on the left side.   Pulmonary/Chest: No accessory muscle usage. No tachypnea. No respiratory distress.   Abdominal: He exhibits no distension.   Musculoskeletal:         General: Normal range of motion.      Cervical back: Normal range of motion.      Right lower le+ Edema present.      Left lower le+ Edema present.     Neurological: He is alert. He has normal strength.   Skin: Skin is dry. There is erythema. No pallor.   Most erythema noted to L posterior leg, middle anterior shin and R medial leg.                 ED Course   Procedures  Labs Reviewed   CBC W/ AUTO DIFFERENTIAL - Abnormal; Notable for the following components:       Result Value    RBC 3.66 (*)     Hemoglobin 11.2 (*)     Hematocrit 33.3 (*)     All other components within normal limits   COMPREHENSIVE METABOLIC PANEL - Abnormal; Notable for the following components:    CO2 30 (*)     Albumin 3.3 (*)      (*)      (*)     All other components within normal limits   C-REACTIVE PROTEIN - Abnormal; Notable for the following components:    CRP 36.2 (*)     All other components within normal limits          Imaging Results          US Lower Extremity Veins Bilateral (Final result)  Result time 06/15/22 15:04:50    Final result by Flash Doyle MD (06/15/22 15:04:50)                 Impression:      1. No evidence of deep venous thrombosis in either lower extremity.  2. Prominent bilateral  groin lymph nodes.    Electronically signed by resident: Parvez Blackwell  Date:    06/15/2022  Time:    14:24    Electronically signed by: Flash Doyle MD  Date:    06/15/2022  Time:    15:04             Narrative:    EXAMINATION:  US LOWER EXTREMITY VEINS BILATERAL    CLINICAL HISTORY:  Localized edema    TECHNIQUE:  Duplex and color flow Doppler and dynamic compression was performed of the bilateral lower extremity veins was performed.    COMPARISON:  None    FINDINGS:  Right thigh veins: The common femoral, femoral, popliteal, upper greater saphenous, and deep femoral veins are patent and free of thrombus. The veins are normally compressible and have normal phasic flow and augmentation response.    Right calf veins: The visualized calf veins are patent.    Left thigh veins: The common femoral, femoral, popliteal, upper greater saphenous, and deep femoral veins are patent and free of thrombus. The veins are normally compressible and have normal phasic flow and augmentation response.  Left popliteal vein is duplicated.    Left calf veins: The visualized calf veins are patent.    Miscellaneous: Prominent bilateral inguinal lymph nodes without pathologic enlargement.                                 Medications   cephALEXin capsule 500 mg (500 mg Oral Given 6/15/22 1522)     Medical Decision Making:   History:   I obtained history from: someone other than patient.       <> Summary of History: Patient and mother.  Old Medical Records: I decided to obtain old medical records.  Old Records Summarized: records from clinic visits and records from previous admission(s).  Initial Assessment:   Patient is a 32 year old male who presents to [facility] ED with bilateral LE edema.   Differential Diagnosis:   Includes but is not limited to cellulitis, dependent edema, chronic venous insufficiency, dvt, kidney injury, liver failure.   Clinical Tests:   Lab Tests: Ordered and Reviewed  Radiological Study: Reviewed and Ordered  ED  Management:  Will initiate work up, obtain US of kathie LE, and continue to monitor.              ED Course as of 06/16/22 1621   Wed Andrey 15, 2022   1227 BP: 133/63 [CL]   1227 Temp: 98 °F (36.7 °C) [CL]   1227 Pulse: 66 [CL]   1227 Resp: 20 [CL]   1227 SpO2: 98 % [CL]   1335 WBC: 5.65 [CL]   1335 Hemoglobin(!): 11.2  Baseline anemia. [CL]   1335 Immature Granulocytes: 0.2 [CL]   1335 Gran %: 59.3 [CL]   1335 No leukocytosis or left shift. He only has 1/4 SIRS criteria (20 RR). He is nontoxic appearing. Doubt sepsis.  [CL]   1412 Sodium: 137 [CL]   1412 Potassium: 3.8 [CL]   1412 Chloride: 99 [CL]   1412 Glucose: 93 [CL]   1412 BUN: 13 [CL]   1412 Creatinine: 0.6 [CL]   1412 BILIRUBIN TOTAL: 0.5 [CL]   1412 AST(!): 124 [CL]   1412 ALT(!): 104  Trending up from 9 days ago. Normal T bili. Patient had neg Hep C test recently. He does not have abdominal pain or signs of jaundice. Will defer work up to PCP. [CL]   1412 CRP(!): 36.2 [CL]   1511 Us does not show evidence of deep venous thrombosis in either lower extremity. Prominent bilateral groin lymph nodes.     Patient likely has cellulitis given edema, erythema, and elevated CRP. Inguinal LN enlargement likely due to infection. His recently HIV test was neg. Will give dose of keflex here and Rx for home. Encourage elevation as much as possible as well as compression stockings. Patient to follow up closely with PCP for repeat labs and evaluation of elevated liver enzymes. Return to ER precautions give, and patient and mother voiced understanding. [CL]      ED Course User Index  [CL] Latricia Colvin PA-C             Clinical Impression:   Final diagnoses:  [R60.0] Bilateral leg edema  [L03.116, L03.115] Bilateral cellulitis of lower leg (Primary)  [R74.8] Elevated liver enzymes          ED Disposition Condition    Discharge Stable        ED Prescriptions     Medication Sig Dispense Start Date End Date Auth. Provider    cephALEXin (KEFLEX) 500 MG capsule Take 1 capsule (500  mg total) by mouth every 6 (six) hours. for 7 days 28 capsule 6/15/2022 6/22/2022 Latricia Colvin PA-C        Follow-up Information     Follow up With Specialties Details Why Contact Info    [facility]  Schedule an appointment as soon as possible for a visit  [phone] [facility] [facility] [address]    [facility]  Schedule an appointment as soon as possible for a visit  [phone] [address] [facility] [address]    [facility] - Emergency Dept Emergency Medicine  If symptoms worsen [address] [address] [phone]             Latricia Colvin PA-C  06/16/22 0973

## 2022-06-15 NOTE — ED TRIAGE NOTES
Having edema to both lower legs/feet for the last week., denies SOB or chest pain . Legs feel tight. Pt states his legs are reddened.  Left knee is swollen. Abusing fentanyl- smokes it. Seen here Friday and admitted as a psych pt and was admitted to [facility].

## 2022-07-05 ENCOUNTER — HOSPITAL ENCOUNTER (EMERGENCY)
Facility: HOSPITAL | Age: 32
Discharge: HOME | End: 2022-07-05
Attending: EMERGENCY MEDICINE
Payer: MEDICAID

## 2022-07-05 VITALS
HEART RATE: 97 BPM | SYSTOLIC BLOOD PRESSURE: 130 MMHG | OXYGEN SATURATION: 96 % | HEIGHT: 75 IN | RESPIRATION RATE: 18 BRPM | TEMPERATURE: 99 F | DIASTOLIC BLOOD PRESSURE: 64 MMHG | WEIGHT: 165 LBS | BODY MASS INDEX: 20.51 KG/M2

## 2022-07-05 DIAGNOSIS — S05.02XA ABRASION OF LEFT CORNEA, INITIAL ENCOUNTER: Primary | ICD-10-CM

## 2022-07-05 PROCEDURE — 25000003 PHARM REV CODE 250: Performed by: STUDENT IN AN ORGANIZED HEALTH CARE EDUCATION/TRAINING PROGRAM

## 2022-07-05 PROCEDURE — 99284 EMERGENCY DEPT VISIT MOD MDM: CPT

## 2022-07-05 PROCEDURE — 99284 EMERGENCY DEPT VISIT MOD MDM: CPT | Mod: ,,, | Performed by: EMERGENCY MEDICINE

## 2022-07-05 PROCEDURE — 99284 PR EMERGENCY DEPT VISIT,LEVEL IV: ICD-10-PCS | Mod: ,,, | Performed by: EMERGENCY MEDICINE

## 2022-07-05 RX ORDER — ERYTHROMYCIN 5 MG/G
OINTMENT OPHTHALMIC
Status: DISCONTINUED | OUTPATIENT
Start: 2022-07-05 | End: 2022-07-05 | Stop reason: HOSPADM

## 2022-07-05 RX ORDER — ERYTHROMYCIN 5 MG/G
OINTMENT OPHTHALMIC
Qty: 3.5 G | Refills: 0 | Status: SHIPPED | OUTPATIENT
Start: 2022-07-05 | End: 2022-07-10

## 2022-07-05 RX ORDER — TETRACAINE HYDROCHLORIDE 5 MG/ML
2 SOLUTION OPHTHALMIC
Status: COMPLETED | OUTPATIENT
Start: 2022-07-05 | End: 2022-07-05

## 2022-07-05 RX ADMIN — TETRACAINE HYDROCHLORIDE 2 DROP: 5 SOLUTION OPHTHALMIC at 08:07

## 2022-07-05 RX ADMIN — FLUORESCEIN SODIUM 1 EACH: 1 STRIP OPHTHALMIC at 08:07

## 2022-07-05 NOTE — ED NOTES
Completed visual acuity screening. Pt could not clearly read all letters. Was able to clearly read up to line 3 with his left eye but unable to proceed. Pt was able to clearly see line 4, but unable to proceed past.

## 2022-07-05 NOTE — DISCHARGE INSTRUCTIONS
Diagnosis:   1. Abrasion of left cornea, initial encounter        Home Care Instructions:  - Medications:  Start applying erythromycin twice daily to eye. Apply artificial tear drops as needed for irritation    Follow-Up Plan:  - Follow-up with: Eye doctor as soon as possible  - Additional testing and/or evaluation will be directed by your primary doctor    Return to the Emergency Department for symptoms including but not limited to: worsening symptoms, severe back pain, shortness of breath or chest pain, vomiting with inability to hold down fluids, blood in vomit or poop, fevers greater than 100.4°F, passing out/fainting/unconsciousness, or other concerning symptoms.

## 2022-07-05 NOTE — ED NOTES
Ulysses Salguero, a 32 y.o. male presents to the ED w/ complaint of L eye irritation [place], reports a lot of rocks on path. Reports eye keeps watering, no pain, no change in vision or associated symptoms     Triage note:  Chief Complaint   Patient presents with    Eye Problem     Reports getting something in his eye yesterday. Reports eye pain and tearing. Denies vision change. Left eye red in triage.     Review of patient's allergies indicates:  No Known Allergies  Past Medical History:   Diagnosis Date    Opiate addiction      Two patient identifiers have been checked and are correct.      Appearance: Pt awake, alert & oriented to person, place & time. Pt in no acute distress at present time. Pt is clean and well groomed with clothes appropriately fastened.   Skin: Skin warm, dry & intact. Color consistent with ethnicity. Mucous membranes moist. No breakdown or brusing noted.   Musculoskeletal: Patient moving all extremities well, no obvious swelling or deformities noted.   Respiratory: Respirations spontaneous, even, and non-labored. Visible chest rise noted. Airway is open and patent. No accessory muscle use noted.   Neurologic: Sensation is intact. Speech is clear and appropriate. Eyes open spontaneously, behavior appropriate to situation, follows commands, facial expression symmetrical, bilateral hand grasp equal and even, purposeful motor response noted.  Cardiac: All peripheral pulses present. No Bilateral lower extremity edema. Cap refill is <3 seconds.  Abdomen: Abdomen soft, non-tender to palpation.   : Pt reports no dysuria or hematuria.
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
## 2022-07-06 ENCOUNTER — PATIENT OUTREACH (OUTPATIENT)
Dept: EMERGENCY MEDICINE | Facility: HOSPITAL | Age: 32
End: 2022-07-06
Payer: MEDICAID

## 2022-07-13 NOTE — PROGRESS NOTES
Attempted to contact patient on 3 separate occasions, patient is unable to reach. ED Navigator to close encounter at this time.        Tonie Connolly, ED Navigator, [facility]  [phone], ext. [phone]

## 2024-03-08 ENCOUNTER — HOSPITAL ENCOUNTER (EMERGENCY)
Facility: HOSPITAL | Age: 34
Discharge: HOME | End: 2024-03-08
Attending: EMERGENCY MEDICINE

## 2024-03-08 VITALS
DIASTOLIC BLOOD PRESSURE: 74 MMHG | HEART RATE: 100 BPM | BODY MASS INDEX: 22.38 KG/M2 | HEIGHT: 75 IN | OXYGEN SATURATION: 96 % | RESPIRATION RATE: 18 BRPM | TEMPERATURE: 98 F | SYSTOLIC BLOOD PRESSURE: 122 MMHG | WEIGHT: 180 LBS

## 2024-03-08 DIAGNOSIS — J06.9 VIRAL URI WITH COUGH: Primary | ICD-10-CM

## 2024-03-08 PROCEDURE — 25000003 PHARM REV CODE 250: Performed by: PHYSICIAN ASSISTANT

## 2024-03-08 PROCEDURE — 99283 EMERGENCY DEPT VISIT LOW MDM: CPT

## 2024-03-08 RX ORDER — IBUPROFEN 400 MG/1
800 TABLET ORAL
Status: COMPLETED | OUTPATIENT
Start: 2024-03-08 | End: 2024-03-08

## 2024-03-08 RX ADMIN — IBUPROFEN 800 MG: 400 TABLET ORAL at 10:03

## 2024-03-08 NOTE — ED TRIAGE NOTES
Pt reports sore throat a little over a weak. Denies fever. Reports productive cough with yellow sputum. Reports some difficulty swallowing. Reports girlfriend had covid last week.

## 2024-03-08 NOTE — ED PROVIDER NOTES
Encounter Date: 3/8/2024       History     Chief Complaint   Patient presents with    Sore Throat     X 2 weeks, cough, HA     9:57 AM  Patient is a 33-year-old male who presents to [facility] ED with a past history of opioid addiction who presents to [facility] ED for emergent evaluation of multiple URI symptoms.    He is in drug court for a past addiction to opioids.     He states that his girlfriend had COVID 2 weeks ago.  1 week ago he started to have a generalized headache, sore throat, fatigue, weakness, and cough.  He did not take any medications today.  He has not had vomiting, diarrhea, dysuria, hematuria.        Review of patient's allergies indicates:  No Known Allergies  Past Medical History:   Diagnosis Date    Opiate addiction      Past Surgical History:   Procedure Laterality Date    MANDIBLE FRACTURE SURGERY      MANDIBLE FRACTURE SURGERY       No family history on file.  Social History     Tobacco Use    Smoking status: Light Smoker     Types: Cigarettes    Smokeless tobacco: Never   Substance Use Topics    Alcohol use: Not Currently    Drug use: Yes     Types: Marijuana     Comment: opiates and fentanyl     Review of Systems   Constitutional:  Positive for activity change and fatigue. Negative for appetite change and fever.   HENT:  Negative for sore throat.    Respiratory:  Negative for shortness of breath.    Cardiovascular:  Negative for chest pain.   Gastrointestinal:  Negative for nausea.   Genitourinary:  Negative for dysuria.   Musculoskeletal:  Negative for back pain.   Skin:  Negative for rash.   Neurological:  Positive for headaches. Negative for weakness.   Hematological:  Does not bruise/bleed easily.       Physical Exam     Initial Vitals [03/08/24 0847]   BP Pulse Resp Temp SpO2   122/74 100 18 97.8 °F (36.6 °C) 96 %      MAP       --         Physical Exam    Vitals reviewed.  Constitutional: He appears well-developed and well-nourished. He is not diaphoretic. He is cooperative.  Non-toxic  appearance. He does not have a sickly appearance. He does not appear ill. No distress.   HENT:   Head: Normocephalic and atraumatic.   Nose: Nose normal.   Mouth/Throat: Oropharynx is clear and moist. No trismus in the jaw. No oropharyngeal exudate, posterior oropharyngeal edema, posterior oropharyngeal erythema or tonsillar abscesses.   Eyes: Conjunctivae and EOM are normal.   Neck:   Normal range of motion.  Cardiovascular:  Normal rate and regular rhythm.           Pulmonary/Chest: Breath sounds normal. No accessory muscle usage. No tachypnea. No respiratory distress. He has no wheezes. He has no rhonchi. He has no rales.   Abdominal: He exhibits no distension.   Musculoskeletal:         General: Normal range of motion.      Cervical back: Normal range of motion.     Neurological: He is alert. He has normal strength.   Skin: Skin is dry. No pallor.         ED Course   Procedures  Labs Reviewed - No data to display       Imaging Results    None          Medications   ibuprofen tablet 800 mg (800 mg Oral Given 3/8/24 1003)     Medical Decision Making  Patient is a 33-year-old male who presents to [facility] ED with a past history of opioid addiction who presents to [facility] ED for emergent evaluation of multiple URI symptoms.    DDx includes but is not limited to covid, other viral syndrome. He is well appearing. Clear lungs. No posterior oropharynx obstruction, trismus, tonsilar edema, exudate. Doubt sepsis, pta, strep.     He likely has viral syndrome. His partner had covid. Will swab. Conservative treatment for viral illness discussed in full. Work excuse provided.   All of his questions were answered.   Patient comfortable with plan and stable for discharge.     Amount and/or Complexity of Data Reviewed  External Data Reviewed: labs.  Labs: ordered.    Risk  Prescription drug management.                                      Clinical Impression:  Final diagnoses:  [J06.9] Viral URI with cough (Primary)          ED  Disposition Condition    Discharge Stable          ED Prescriptions    None       Follow-up Information    None          Latricia Colvin PA-C  03/08/24 6211

## 2024-03-08 NOTE — DISCHARGE INSTRUCTIONS
Your girlfriend had COVID a few weeks ago and now you have symptoms.  We have swabbed you for COVID. [facility].  If you are negative, you still have a common cold caused by another virus.  You do not need antibiotics.   Take over-the-counter medication for symptoms such as decongestants, cough suppressants, sore throat medication, etc.  Take acetaminophen/Tylenol 650 mg every 6 hr for pain relief for fever reduction.  You can take ibuprofen 600 mg every 6 hr for additional relief.  Rest.  Stay hydrated.  Continue to social isolate.  Return to the ED for any concerning signs or symptoms.  No future appointments.

## 2024-03-08 NOTE — Clinical Note
"Ulysses"Shirley Salguero was seen and treated in our emergency department on 3/8/2024.     COVID-19 is present in our communities across the state. There is limited testing for COVID at this time, so not all patients can be tested. In this situation, your employee meets the following criteria:    Ulysses Salguero has met the criteria for COVID-19 testing based upon symptoms, travel, and/or potential exposure. The test has been completed and is pending results at this time. During this time the employee is not able to work and should be quarantined per the Centers for Disease Control timelines.     If you have any questions or concerns, or if I can be of further assistance, please do not hesitate to contact me.    Sincerely,             Latricia Colvin PA-C"

## 2024-03-15 ENCOUNTER — HOSPITAL ENCOUNTER (EMERGENCY)
Facility: HOSPITAL | Age: 34
Discharge: HOME | End: 2024-03-15
Attending: EMERGENCY MEDICINE
Payer: COMMERCIAL

## 2024-03-15 VITALS
BODY MASS INDEX: 23 KG/M2 | RESPIRATION RATE: 16 BRPM | HEIGHT: 75 IN | DIASTOLIC BLOOD PRESSURE: 60 MMHG | SYSTOLIC BLOOD PRESSURE: 113 MMHG | OXYGEN SATURATION: 100 % | TEMPERATURE: 98 F | HEART RATE: 66 BPM | WEIGHT: 185 LBS

## 2024-03-15 DIAGNOSIS — G51.0 BELL'S PALSY: ICD-10-CM

## 2024-03-15 DIAGNOSIS — T50.901A ACCIDENTAL DRUG OVERDOSE, INITIAL ENCOUNTER: Primary | ICD-10-CM

## 2024-03-15 DIAGNOSIS — R29.818 ACUTE FOCAL NEUROLOGICAL DEFICIT: ICD-10-CM

## 2024-03-15 DIAGNOSIS — R41.82 ALTERED MENTAL STATUS, UNSPECIFIED ALTERED MENTAL STATUS TYPE: ICD-10-CM

## 2024-03-15 LAB
ALBUMIN SERPL BCP-MCNC: 3.9 G/DL (ref 3.5–5.2)
ALLENS TEST: ABNORMAL
ALLENS TEST: NORMAL
ALP SERPL-CCNC: 91 U/L (ref 55–135)
ALT SERPL W/O P-5'-P-CCNC: 16 U/L (ref 10–44)
AMPHET+METHAMPHET UR QL: NEGATIVE
ANION GAP SERPL CALC-SCNC: 14 MMOL/L (ref 8–16)
ANION GAP SERPL CALC-SCNC: 9 MMOL/L (ref 8–16)
AST SERPL-CCNC: 23 U/L (ref 10–40)
BARBITURATES UR QL SCN>200 NG/ML: NEGATIVE
BASOPHILS # BLD AUTO: 0.04 K/UL (ref 0–0.2)
BASOPHILS NFR BLD: 0.2 % (ref 0–1.9)
BENZODIAZ UR QL SCN>200 NG/ML: NEGATIVE
BILIRUB SERPL-MCNC: 0.7 MG/DL (ref 0.1–1)
BUN SERPL-MCNC: 19 MG/DL (ref 6–20)
BUN SERPL-MCNC: 20 MG/DL (ref 6–30)
BZE UR QL SCN: NEGATIVE
CALCIUM SERPL-MCNC: 9 MG/DL (ref 8.7–10.5)
CANNABINOIDS UR QL SCN: NEGATIVE
CHLORIDE SERPL-SCNC: 103 MMOL/L (ref 95–110)
CHLORIDE SERPL-SCNC: 98 MMOL/L (ref 95–110)
CHOLEST SERPL-MCNC: 121 MG/DL (ref 120–199)
CHOLEST/HDLC SERPL: 2 {RATIO} (ref 2–5)
CO2 SERPL-SCNC: 25 MMOL/L (ref 23–29)
CREAT SERPL-MCNC: 0.9 MG/DL (ref 0.5–1.4)
CREAT SERPL-MCNC: 1 MG/DL (ref 0.5–1.4)
CREAT UR-MCNC: 121.9 MG/DL (ref 23–375)
DIFFERENTIAL METHOD BLD: ABNORMAL
EOSINOPHIL # BLD AUTO: 0 K/UL (ref 0–0.5)
EOSINOPHIL NFR BLD: 0.1 % (ref 0–8)
ERYTHROCYTE [DISTWIDTH] IN BLOOD BY AUTOMATED COUNT: 12.2 % (ref 11.5–14.5)
EST. GFR  (NO RACE VARIABLE): >60 ML/MIN/1.73 M^2
GLUCOSE SERPL-MCNC: 170 MG/DL (ref 70–110)
GLUCOSE SERPL-MCNC: 170 MG/DL (ref 70–110)
HCT VFR BLD AUTO: 36.9 % (ref 40–54)
HCT VFR BLD CALC: 39 %PCV (ref 36–54)
HDLC SERPL-MCNC: 60 MG/DL (ref 40–75)
HDLC SERPL: 49.6 % (ref 20–50)
HGB BLD-MCNC: 13.1 G/DL (ref 14–18)
IMM GRANULOCYTES # BLD AUTO: 0.09 K/UL (ref 0–0.04)
IMM GRANULOCYTES NFR BLD AUTO: 0.5 % (ref 0–0.5)
INR PPP: 1.1 (ref 0.8–1.2)
LDLC SERPL CALC-MCNC: 54.8 MG/DL (ref 63–159)
LYMPHOCYTES # BLD AUTO: 0.4 K/UL (ref 1–4.8)
LYMPHOCYTES NFR BLD: 2.2 % (ref 18–48)
MCH RBC QN AUTO: 33 PG (ref 27–31)
MCHC RBC AUTO-ENTMCNC: 35.5 G/DL (ref 32–36)
MCV RBC AUTO: 93 FL (ref 82–98)
METHADONE UR QL SCN>300 NG/ML: NEGATIVE
MONOCYTES # BLD AUTO: 0.8 K/UL (ref 0.3–1)
MONOCYTES NFR BLD: 4.3 % (ref 4–15)
NEUTROPHILS # BLD AUTO: 16.8 K/UL (ref 1.8–7.7)
NEUTROPHILS NFR BLD: 92.7 % (ref 38–73)
NONHDLC SERPL-MCNC: 61 MG/DL
NRBC BLD-RTO: 0 /100 WBC
OPIATES UR QL SCN: NEGATIVE
PCP UR QL SCN>25 NG/ML: NEGATIVE
PLATELET # BLD AUTO: 237 K/UL (ref 150–450)
PMV BLD AUTO: 9.7 FL (ref 9.2–12.9)
POC IONIZED CALCIUM: 1.22 MMOL/L (ref 1.06–1.42)
POC PTINR: 1.1 (ref 0.9–1.2)
POC PTWBT: 12.7 SEC (ref 9.7–14.3)
POC TCO2 (MEASURED): 29 MMOL/L (ref 23–29)
POCT GLUCOSE: 168 MG/DL (ref 70–110)
POTASSIUM BLD-SCNC: 3.8 MMOL/L (ref 3.5–5.1)
POTASSIUM SERPL-SCNC: 3.9 MMOL/L (ref 3.5–5.1)
PROT SERPL-MCNC: 6.7 G/DL (ref 6–8.4)
PROTHROMBIN TIME: 11.9 SEC (ref 9–12.5)
RBC # BLD AUTO: 3.97 M/UL (ref 4.6–6.2)
SAMPLE: ABNORMAL
SAMPLE: NORMAL
SITE: ABNORMAL
SITE: NORMAL
SODIUM BLD-SCNC: 136 MMOL/L (ref 136–145)
SODIUM SERPL-SCNC: 137 MMOL/L (ref 136–145)
T4 FREE SERPL-MCNC: 1.15 NG/DL (ref 0.71–1.51)
TOXICOLOGY INFORMATION: NORMAL
TRIGL SERPL-MCNC: 31 MG/DL (ref 30–150)
TSH SERPL DL<=0.005 MIU/L-ACNC: 0.33 UIU/ML (ref 0.4–4)
WBC # BLD AUTO: 18.15 K/UL (ref 3.9–12.7)

## 2024-03-15 PROCEDURE — 82565 ASSAY OF CREATININE: CPT

## 2024-03-15 PROCEDURE — 84443 ASSAY THYROID STIM HORMONE: CPT | Performed by: EMERGENCY MEDICINE

## 2024-03-15 PROCEDURE — 85025 COMPLETE CBC W/AUTO DIFF WBC: CPT | Performed by: EMERGENCY MEDICINE

## 2024-03-15 PROCEDURE — 85610 PROTHROMBIN TIME: CPT | Performed by: EMERGENCY MEDICINE

## 2024-03-15 PROCEDURE — 93010 ELECTROCARDIOGRAM REPORT: CPT | Mod: ,,, | Performed by: INTERNAL MEDICINE

## 2024-03-15 PROCEDURE — 99900035 HC TECH TIME PER 15 MIN (STAT)

## 2024-03-15 PROCEDURE — 85610 PROTHROMBIN TIME: CPT

## 2024-03-15 PROCEDURE — 80061 LIPID PANEL: CPT | Performed by: EMERGENCY MEDICINE

## 2024-03-15 PROCEDURE — 84295 ASSAY OF SERUM SODIUM: CPT | Mod: 91

## 2024-03-15 PROCEDURE — 82962 GLUCOSE BLOOD TEST: CPT

## 2024-03-15 PROCEDURE — 99204 OFFICE O/P NEW MOD 45 MIN: CPT | Mod: GT,,, | Performed by: STUDENT IN AN ORGANIZED HEALTH CARE EDUCATION/TRAINING PROGRAM

## 2024-03-15 PROCEDURE — 80053 COMPREHEN METABOLIC PANEL: CPT | Performed by: EMERGENCY MEDICINE

## 2024-03-15 PROCEDURE — 80047 BASIC METABLC PNL IONIZED CA: CPT

## 2024-03-15 PROCEDURE — 84132 ASSAY OF SERUM POTASSIUM: CPT

## 2024-03-15 PROCEDURE — 93005 ELECTROCARDIOGRAM TRACING: CPT

## 2024-03-15 PROCEDURE — 85014 HEMATOCRIT: CPT

## 2024-03-15 PROCEDURE — 84439 ASSAY OF FREE THYROXINE: CPT | Performed by: EMERGENCY MEDICINE

## 2024-03-15 PROCEDURE — 82330 ASSAY OF CALCIUM: CPT

## 2024-03-15 PROCEDURE — 99285 EMERGENCY DEPT VISIT HI MDM: CPT | Mod: 25

## 2024-03-15 PROCEDURE — 80307 DRUG TEST PRSMV CHEM ANLYZR: CPT | Performed by: EMERGENCY MEDICINE

## 2024-03-15 RX ORDER — PREDNISONE 10 MG/1
TABLET ORAL
Qty: 55 TABLET | Refills: 0 | OUTPATIENT
Start: 2024-03-15 | End: 2024-03-16

## 2024-03-15 RX ORDER — VALACYCLOVIR HYDROCHLORIDE 500 MG/1
500 TABLET, FILM COATED ORAL 2 TIMES DAILY
Qty: 10 TABLET | Refills: 0 | OUTPATIENT
Start: 2024-03-15 | End: 2024-03-16

## 2024-03-15 RX ORDER — METFORMIN HYDROCHLORIDE 500 MG/1
500 TABLET ORAL 2 TIMES DAILY WITH MEALS
Qty: 60 TABLET | Refills: 0 | Status: SHIPPED | OUTPATIENT
Start: 2024-03-15 | End: 2024-04-14

## 2024-03-15 RX ORDER — CARBOXYMETHYLCELLULOSE SODIUM 10 MG/ML
2 SOLUTION/ DROPS OPHTHALMIC EVERY 4 HOURS
Qty: 15 ML | Refills: 2 | Status: SHIPPED | OUTPATIENT
Start: 2024-03-15 | End: 2024-03-29

## 2024-03-15 NOTE — SUBJECTIVE & OBJECTIVE
"HPI:  33 y.o. male w/ PMHX of opiate overdose and dependence, tobacco abuse, Hx of R mandibular fracture who presents from [facility] after being found down unresponsive. Received Narcan w/ resolution of symptoms. He is unsure as to what he ingested.   RFD noted on eval in ED and stroke code activated for the isolated facial weakness.      Images personally reviewed and interpreted:  Study: Head CT  Study Interpretation: No acute intracranial abnormalities     Assessment and plan:  NIHSS 2.   Not a TNK candidate.   Patient states that symptoms started "just now"  Exam is consistent w/ R Newton's palsy w/ incomplete OD closure, delayed blink on affected side, reported dry OD and paralysis of the lower face - suspect this is early in the disease process and thus the forehead is not yet involved.     Treatment option for Bell's palsy:  - Prednisone 60mg po qd for 6 days followed by taper for total of 10 days of treatment (close monitoring of blood sugar given history of diabetes)  - Valacyclovir 500 mg po bid for 5 days  - Eye care with optic ointment at night with eye patch and drops during the day (informed patient that dry eye can cause vision loss).       Lytics recommendation: Thrombolytic therapy not recommended due to Outside of treatment window , Suspected stroke mimic , and Mild Non-Disabling Symptoms  Thrombectomy recommendation: Awaiting CTA results from Spoke for determination   Placement recommendation: pending further studies             "

## 2024-03-15 NOTE — TELEMEDICINE CONSULT
[facility]  Vascular Neurology  Comprehensive Stroke Center  TeleVascular Neurology Acute Consultation Note        Consult Information  Consult to Telemedicine - Acute Stroke  Consult performed by: Myriam Zamora MD  Consult ordered by: Nicolasa Chaudhari MD          Consulting Provider: NICOLASA CHAUDHARI.   Current Providers  No providers found    Patient Location:  [facility] EMERGENCY DEPARTMENT Emergency Department    Spoke hospital nurse at bedside with patient assisting consultant.  Patient information was obtained from patient.       Stroke Documentation  Acute Stroke Times   Last Known Normal Date: 03/15/24  Unknown Symptom Onset Date: Unknown Date  Stroke Team Called Date: 03/15/24  Stroke Team Called Time: 1539  Stroke Team Arrival Date: 03/15/24  Stroke Team Arrival Time: 1542 (Patient in CT, requested a call back through [facility] when spoke to bedside nursing staff)  CT Interpretation Time: 1540    NIH Scale:  1a. Level of Consciousness: 0-->Alert, keenly responsive  1b. LOC Questions: 0-->Answers both questions correctly  1c. LOC Commands: 0-->Performs both tasks correctly  2. Best Gaze: 0-->Normal  3. Visual: 0-->No visual loss  4. Facial Palsy: 2-->Partial paralysis (total or near-total paralysis of lower face)  5a. Motor Arm, Left: 0-->No drift, limb holds 90 (or 45) degrees for full 10 secs  5b. Motor Arm, Right: 0-->No drift, limb holds 90 (or 45) degrees for full 10 secs  6a. Motor Leg, Left: 0-->No drift, leg holds 30 degree position for full 5 secs  6b. Motor Leg, Right: 0-->No drift, leg holds 30 degree position for full 5 secs  7. Limb Ataxia: 0-->Absent  8. Sensory: 0-->Normal, no sensory loss  9. Best Language: 0-->No aphasia, normal  10. Dysarthria: 0-->Normal  11. Extinction and Inattention (formerly Neglect): 0-->No abnormality  Total (NIH Stroke Scale): 2      Modified Hamilton: Score: 0  Nora Coma Scale:     ABCD2 Score:    [id number] Score:    HAS -BLED Score:    ICH  "Score:    Hunt & Marroquin Classification:      Blood pressure (!) 150/90, pulse 84, temperature 97.5 °F (36.4 °C), temperature source Oral, resp. rate 18, height 6' 3" (1.905 m), weight 83.9 kg (185 lb), SpO2 100 %.    Van Negative    Medical Decision Making  HPI:  33 y.o. male w/ PMHX of opiate overdose and dependence, tobacco abuse, Hx of R mandibular fracture who presents from [facility] after being found down unresponsive. Received Narcan w/ resolution of symptoms. He is unsure as to what he ingested.   RFD noted on eval in ED and stroke code activated for the isolated facial weakness.      Images personally reviewed and interpreted:  Study: Head CT  Study Interpretation: No acute intracranial abnormalities     Assessment and plan:  NIHSS 2.   Not a TNK candidate.   Patient states that symptoms started "just now"  Exam is consistent w/ R Newton's palsy w/ incomplete OD closure, delayed blink on affected side, reported dry OD and paralysis of the lower face - suspect this is early in the disease process and thus the forehead is not yet involved.     Treatment option for Bell's palsy:  - Prednisone 60mg po qd for 6 days followed by taper for total of 10 days of treatment (close monitoring of blood sugar given history of diabetes)  - Valacyclovir 500 mg po bid for 5 days  - Eye care with optic ointment at night with eye patch and drops during the day (informed patient that dry eye can cause vision loss).       Lytics recommendation: Thrombolytic therapy not recommended due to Outside of treatment window , Suspected stroke mimic , and Mild Non-Disabling Symptoms  Thrombectomy recommendation: Awaiting CTA results from [facility] for determination   Placement recommendation: pending further studies               ROS  Physical Exam  Past Medical History:   Diagnosis Date    Opiate addiction      Past Surgical History:   Procedure Laterality Date    MANDIBLE FRACTURE SURGERY      MANDIBLE FRACTURE SURGERY       No family history on " file.    Diagnoses  No problems updated.    Myriam Zamora MD      Emergent/Acute neurological consultation requested by spoke provider due to critical concerns for possible cerebrovascular event that could result in permanent loss of neurologic/bodily function, severe disability or death of this patient.  Immediate/timely evaluation by a highly prepared expert is paramount for optimal outcomes  High risk for neurological deterioration if not properly diagnosed  High risk for neurological deterioration if not treated promplty/as soon as possible  Complex diagnostic evaluation may be required (advanced imaging)  High risk treatment options (thrombolytics and/or thrombectomy)    Patient care was coordinated with spoke provider, including but not limted to    Discussing likely diagnosis/etiology of symptoms  Making recommendations for further diagnostic studies  Making recommendations for intravenous thrombolytics or other advanced therapies  Making recommendations for disposition (admission/transfer for higher level of care)

## 2024-03-15 NOTE — ED PROVIDER NOTES
Encounter Date: 3/15/2024    SCRIBE #1 NOTE: I, Ember Nava, am scribing for, and in the presence of,  Kimo Chaudhari MD. I have scribed the following portions of the note - Other sections scribed: HPI, ROS.       History     Chief Complaint   Patient presents with    Drug Overdose     EMS called to  [facility] for unresponsive patient. Officers found him unresponsive in the alf and gave him 8mg narcan IN with positve response. Patient admitted to taking something but doesn't know what. Patient is in custody.      Ulysses Salguero is a 33 y.o. male, with PMHx of opiate addiction, who presents to the ED after he was found unresponsive in alf [facility]. Patient was blue in the face and spo2 was 35% on RA. Officers in the alf and gave him 8mg narcan IN with positve response. Patient is poor historian due to AMS. Patient admits to taking something but is unsure, possibly fentanyl. Patient states he currently feels fine. Patient denies history of Bels palsy or stroke.      The history is provided by the patient. The history is limited by the condition of the patient. No  was used.     Review of patient's allergies indicates:  No Known Allergies  Past Medical History:   Diagnosis Date    Opiate addiction      Past Surgical History:   Procedure Laterality Date    MANDIBLE FRACTURE SURGERY      MANDIBLE FRACTURE SURGERY       No family history on file.  Social History     Tobacco Use    Smoking status: Light Smoker     Types: Cigarettes    Smokeless tobacco: Never   Substance Use Topics    Alcohol use: Not Currently    Drug use: Yes     Types: Marijuana     Comment: opiates and fentanyl     Review of Systems   Unable to perform ROS: Mental status change   Skin:  Positive for color change.       Physical Exam     Initial Vitals [03/15/24 1517]   BP Pulse Resp Temp SpO2   (!) 150/90 84 18 97.5 °F (36.4 °C) 100 %      MAP       --         Physical Exam  The patient was examined specifically for the  following:   General:No significant distress, Good color, Warm and dry. Head and neck:Scalp atraumatic, Neck supple. Neurological:Appropriate conversation, Gross motor deficits. Eyes:Conjugate gaze, Clear corneas. ENT: No epistaxis. Cardiac: Regular rate and rhythm, Grossly normal heart tones. Pulmonary: Wheezing, Rales. Gastrointestinal: Abdominal tenderness, Abdominal distention. Musculoskeletal: Extremity deformity, Apparent pain with range of motion of the joints. Skin: Rash.   The findings on examination were normal except for the following:  The patient has an incomplete right-sided facial paralysis.  Ocular movements are normal.  The lungs are clear.  The heart tones are normal.  The abdomen is soft.  Extremities are nontender there is no apparent pain with range of motion any joints.  The patient has no rash.  I see no asymmetry of the forehead wrinkle.  ED Course   Procedures  Labs Reviewed   CBC W/ AUTO DIFFERENTIAL - Abnormal; Notable for the following components:       Result Value    WBC 18.15 (*)     RBC 3.97 (*)     Hemoglobin 13.1 (*)     Hematocrit 36.9 (*)     MCH 33.0 (*)     Gran # (ANC) 16.8 (*)     Immature Grans (Abs) 0.09 (*)     Lymph # 0.4 (*)     Gran % 92.7 (*)     Lymph % 2.2 (*)     All other components within normal limits   COMPREHENSIVE METABOLIC PANEL - Abnormal; Notable for the following components:    Glucose 170 (*)     All other components within normal limits   TSH - Abnormal; Notable for the following components:    TSH 0.333 (*)     All other components within normal limits   LIPID PANEL - Abnormal; Notable for the following components:    LDL Cholesterol 54.8 (*)     All other components within normal limits   POCT GLUCOSE - Abnormal; Notable for the following components:    POCT Glucose 168 (*)     All other components within normal limits   ISTAT PROCEDURE - Abnormal; Notable for the following components:    POC Glucose 170 (*)     All other components within normal limits    PROTIME-INR   DRUG SCREEN PANEL, URINE EMERGENCY    Narrative:     Specimen Source->Urine   T4, FREE   POCT GLUCOSE, HAND-HELD DEVICE   POCT PROTIME-INR   ISTAT PROCEDURE   ISTAT CHEM8          Imaging Results              CT Head Without Contrast (Final result)  Result time 03/15/24 15:53:20      Final result by Derek Heard MD (03/15/24 15:53:20)                   Impression:      No intracranial mass, hemorrhage, or evidence of acute infarction.      Electronically signed by: Derek Heard  Date:    03/15/2024  Time:    15:53               Narrative:    EXAMINATION:  CT HEAD WITHOUT CONTRAST    CLINICAL HISTORY:  Neuro deficit, acute, stroke suspected;    TECHNIQUE:  Low dose axial images were obtained through the head.  Coronal and sagittal reformations were also performed. Contrast was not administered.    COMPARISON:  None.    FINDINGS:  BRAIN: The brain parenchyma demonstrates no significant abnormality. No intracranial mass or hemorrhage. No evidence of acute infarction.    CSF SPACES: Ventricles, sulci, and cisterns are normal in size and configuration.    VESSELS: The major intracranial vessels are unremarkable.    BONES: No fracture or focal osseous lesion. Paranasal sinuses and mastoid air cells are well aerated.    SOFT TISSUES: Extracranial soft tissues are unremarkable.                                       Medications - No data to display  Medical Decision Making  Amount and/or Complexity of Data Reviewed  Labs: ordered.  Radiology: ordered.    Given the above, this patient is brought to the emergency room from residential when the patient was found unresponsive.  He had very low oxygen saturations.  He was resuscitated using Narcan.  Is awake and alert in the emergency room.  Of note he had a right-sided facial droop.  The stroke code was called given the combination of altered mental status with a right-sided facial droop.  Neurology believes that this is a Bell's palsy.  I will treat Bell's palsy as  they recommend.  I will have the patient check his blood sugar before prednisone every day and discontinue the prednisone if his blood sugar is greater than 250.  I will treat with valacyclovir as directed.  I will recommend patching the eye shut.  I will recommend tears.  I will recommend outpatient evaluation with Neurology.  The patient has a leukocytosis without fever that I believe this is demargination phenomenon.  The patient has a mild anemia with a hemoglobin of 13.  I find no focus of infection.  The patient has a low TSH but a normal T4 I will not treat for hypothyroidism.  The patient's tox screen is negative the patient believes he did fentanyl by snorting before this event.  The patient's blood glucose is 170, here today.        Scribe Attestation:   Scribe #1: I performed the above scribed service and the documentation accurately describes the services I performed. I attest to the accuracy of the note.                               Clinical Impression:  Final diagnoses:  [R29.818] Acute focal neurological deficit  [T50.901A] Accidental drug overdose, initial encounter (Primary)  [R41.82] Altered mental status, unspecified altered mental status type  [G51.0] Bell's palsy          ED Disposition Condition    Discharge Stable        Please note that the documentation on this chart was provided by the scribe above on the date of service noted above, and that the documentation in the chart accurately reflects the work and decisions made by me alone.  Signed, Dr. Chaudhari  ED Prescriptions       Medication Sig Dispense Start Date End Date Auth. Provider    predniSONE (DELTASONE) 10 MG tablet Po qd: 6 tabs x 5d; 5 tabs x 2d; 4 tabs x 2d; 3 tabs x 1d; 2 tabs x 1d; 1 tab x 1d; 1/2 tqab x 2d 55 tablet 3/15/2024 -- Kimo Chaudhari MD    carboxymethylcellulose sodium (ARTIFICIAL TEARS, [facility],) 1 % Drop Apply 2 drops to eye every 4 (four) hours. for 14 days 15 mL 3/15/2024 3/29/2024 iKmo Chaudhari MD     valACYclovir (VALTREX) 500 MG tablet Take 1 tablet (500 mg total) by mouth 2 (two) times daily. for 5 days 10 tablet 3/15/2024 3/20/2024 Kimo Chaudhari MD    metFORMIN (GLUCOPHAGE) 500 MG tablet Take 1 tablet (500 mg total) by mouth 2 (two) times daily with meals. 60 tablet 3/15/2024 4/14/2024 Kimo Chaudhari MD          Follow-up Information       Follow up With Specialties Details Why Contact Info    Divya Thomas MD Neurology In 1 week  [address]  [facility] [address]  [phone]               Kimo Chaudhari MD  03/15/24 1957

## 2024-03-15 NOTE — ED TRIAGE NOTES
"Pt presents to ED via EMS accompanied by 2  police officers from [facility] due to being found down in bunk bed at approx 2 PM. [place] reports that pts skin was blue in color and O2 sats were 35% on RA. 8 mg of Narcan was give nasally with positive response. Pt admits to taking "something" to [employer] officers. Upon assessment when asked what did he take, pt states "I think it was fentanyl". Pt has no blinking to the right eye and when smiling has right sided droop. No drift noted in any extremities. Dr. Chaudhari at bedside.    "

## 2024-03-16 ENCOUNTER — HOSPITAL ENCOUNTER (EMERGENCY)
Facility: HOSPITAL | Age: 34
Discharge: HOME | End: 2024-03-16
Attending: EMERGENCY MEDICINE

## 2024-03-16 VITALS
TEMPERATURE: 98 F | SYSTOLIC BLOOD PRESSURE: 114 MMHG | HEART RATE: 82 BPM | BODY MASS INDEX: 22.38 KG/M2 | RESPIRATION RATE: 18 BRPM | HEIGHT: 75 IN | DIASTOLIC BLOOD PRESSURE: 68 MMHG | OXYGEN SATURATION: 95 % | WEIGHT: 180 LBS

## 2024-03-16 DIAGNOSIS — G51.0 BELL'S PALSY: Primary | ICD-10-CM

## 2024-03-16 DIAGNOSIS — H66.91 RIGHT OTITIS MEDIA, UNSPECIFIED OTITIS MEDIA TYPE: ICD-10-CM

## 2024-03-16 DIAGNOSIS — G56.31 RADIAL NERVE PALSY, RIGHT: ICD-10-CM

## 2024-03-16 DIAGNOSIS — E23.6 EMPTY SELLA: ICD-10-CM

## 2024-03-16 DIAGNOSIS — F11.10 OPIATE ABUSE, CONTINUOUS: ICD-10-CM

## 2024-03-16 LAB
ALBUMIN SERPL BCP-MCNC: 3.9 G/DL (ref 3.5–5.2)
ALP SERPL-CCNC: 89 U/L (ref 55–135)
ALT SERPL W/O P-5'-P-CCNC: 15 U/L (ref 10–44)
ANION GAP SERPL CALC-SCNC: 7 MMOL/L (ref 8–16)
AST SERPL-CCNC: 20 U/L (ref 10–40)
BASOPHILS # BLD AUTO: 0.03 K/UL (ref 0–0.2)
BASOPHILS NFR BLD: 0.2 % (ref 0–1.9)
BILIRUB SERPL-MCNC: 0.4 MG/DL (ref 0.1–1)
BUN SERPL-MCNC: 19 MG/DL (ref 6–20)
CALCIUM SERPL-MCNC: 8.5 MG/DL (ref 8.7–10.5)
CHLORIDE SERPL-SCNC: 104 MMOL/L (ref 95–110)
CK SERPL-CCNC: 163 U/L (ref 20–200)
CO2 SERPL-SCNC: 24 MMOL/L (ref 23–29)
CREAT SERPL-MCNC: 0.9 MG/DL (ref 0.5–1.4)
DIFFERENTIAL METHOD BLD: ABNORMAL
EOSINOPHIL # BLD AUTO: 0.1 K/UL (ref 0–0.5)
EOSINOPHIL NFR BLD: 0.4 % (ref 0–8)
ERYTHROCYTE [DISTWIDTH] IN BLOOD BY AUTOMATED COUNT: 12.5 % (ref 11.5–14.5)
EST. GFR  (NO RACE VARIABLE): >60 ML/MIN/1.73 M^2
GLUCOSE SERPL-MCNC: 137 MG/DL (ref 70–110)
HCT VFR BLD AUTO: 36.5 % (ref 40–54)
HGB BLD-MCNC: 12.4 G/DL (ref 14–18)
IMM GRANULOCYTES # BLD AUTO: 0.05 K/UL (ref 0–0.04)
IMM GRANULOCYTES NFR BLD AUTO: 0.3 % (ref 0–0.5)
LACTATE SERPL-SCNC: 0.7 MMOL/L (ref 0.5–2.2)
LYMPHOCYTES # BLD AUTO: 0.9 K/UL (ref 1–4.8)
LYMPHOCYTES NFR BLD: 5.2 % (ref 18–48)
MCH RBC QN AUTO: 31.2 PG (ref 27–31)
MCHC RBC AUTO-ENTMCNC: 34 G/DL (ref 32–36)
MCV RBC AUTO: 92 FL (ref 82–98)
MONOCYTES # BLD AUTO: 1.4 K/UL (ref 0.3–1)
MONOCYTES NFR BLD: 8.3 % (ref 4–15)
NEUTROPHILS # BLD AUTO: 13.9 K/UL (ref 1.8–7.7)
NEUTROPHILS NFR BLD: 85.6 % (ref 38–73)
NRBC BLD-RTO: 0 /100 WBC
PLATELET # BLD AUTO: 232 K/UL (ref 150–450)
PMV BLD AUTO: 9.8 FL (ref 9.2–12.9)
POCT GLUCOSE: 120 MG/DL (ref 70–110)
POTASSIUM SERPL-SCNC: 4.1 MMOL/L (ref 3.5–5.1)
PROT SERPL-MCNC: 6.4 G/DL (ref 6–8.4)
RBC # BLD AUTO: 3.98 M/UL (ref 4.6–6.2)
SODIUM SERPL-SCNC: 135 MMOL/L (ref 136–145)
WBC # BLD AUTO: 16.25 K/UL (ref 3.9–12.7)

## 2024-03-16 PROCEDURE — 82962 GLUCOSE BLOOD TEST: CPT

## 2024-03-16 PROCEDURE — 99285 EMERGENCY DEPT VISIT HI MDM: CPT | Mod: 25

## 2024-03-16 PROCEDURE — 96374 THER/PROPH/DIAG INJ IV PUSH: CPT

## 2024-03-16 PROCEDURE — 85025 COMPLETE CBC W/AUTO DIFF WBC: CPT | Performed by: EMERGENCY MEDICINE

## 2024-03-16 PROCEDURE — 25000003 PHARM REV CODE 250: Performed by: EMERGENCY MEDICINE

## 2024-03-16 PROCEDURE — 25500020 PHARM REV CODE 255: Performed by: EMERGENCY MEDICINE

## 2024-03-16 PROCEDURE — 80053 COMPREHEN METABOLIC PANEL: CPT | Performed by: EMERGENCY MEDICINE

## 2024-03-16 PROCEDURE — 63600175 PHARM REV CODE 636 W HCPCS: Performed by: EMERGENCY MEDICINE

## 2024-03-16 PROCEDURE — A9585 GADOBUTROL INJECTION: HCPCS | Performed by: EMERGENCY MEDICINE

## 2024-03-16 PROCEDURE — 82550 ASSAY OF CK (CPK): CPT | Performed by: EMERGENCY MEDICINE

## 2024-03-16 PROCEDURE — 87040 BLOOD CULTURE FOR BACTERIA: CPT | Performed by: EMERGENCY MEDICINE

## 2024-03-16 PROCEDURE — 83605 ASSAY OF LACTIC ACID: CPT | Performed by: EMERGENCY MEDICINE

## 2024-03-16 RX ORDER — AMOXICILLIN AND CLAVULANATE POTASSIUM 875; 125 MG/1; MG/1
1 TABLET, FILM COATED ORAL 2 TIMES DAILY
Qty: 20 TABLET | Refills: 0 | Status: SHIPPED | OUTPATIENT
Start: 2024-03-16 | End: 2024-03-26

## 2024-03-16 RX ORDER — PREDNISONE 10 MG/1
TABLET ORAL
Qty: 55 TABLET | Refills: 0 | Status: SHIPPED | OUTPATIENT
Start: 2024-03-16

## 2024-03-16 RX ORDER — VALACYCLOVIR HYDROCHLORIDE 1 G/1
1000 TABLET, FILM COATED ORAL 3 TIMES DAILY
Qty: 21 TABLET | Refills: 0 | Status: SHIPPED | OUTPATIENT
Start: 2024-03-16 | End: 2024-03-16

## 2024-03-16 RX ORDER — AMOXICILLIN AND CLAVULANATE POTASSIUM 875; 125 MG/1; MG/1
1 TABLET, FILM COATED ORAL 2 TIMES DAILY
Qty: 20 TABLET | Refills: 0 | Status: SHIPPED | OUTPATIENT
Start: 2024-03-16 | End: 2024-03-16

## 2024-03-16 RX ORDER — NALOXONE HCL 0.4 MG/ML
0.4 VIAL (ML) INJECTION
Status: COMPLETED | OUTPATIENT
Start: 2024-03-16 | End: 2024-03-16

## 2024-03-16 RX ORDER — GADOBUTROL 604.72 MG/ML
8 INJECTION INTRAVENOUS
Status: COMPLETED | OUTPATIENT
Start: 2024-03-16 | End: 2024-03-16

## 2024-03-16 RX ORDER — PREDNISONE 10 MG/1
TABLET ORAL
Qty: 55 TABLET | Refills: 0 | Status: SHIPPED | OUTPATIENT
Start: 2024-03-16 | End: 2024-03-16

## 2024-03-16 RX ORDER — VALACYCLOVIR HYDROCHLORIDE 1 G/1
1000 TABLET, FILM COATED ORAL 3 TIMES DAILY
Qty: 21 TABLET | Refills: 0 | Status: SHIPPED | OUTPATIENT
Start: 2024-03-16 | End: 2024-03-23

## 2024-03-16 RX ADMIN — GADOBUTROL 8 ML: 604.72 INJECTION INTRAVENOUS at 12:03

## 2024-03-16 RX ADMIN — NALXONE HYDROCHLORIDE 0.4 MG: 0.4 INJECTION INTRAMUSCULAR; INTRAVENOUS; SUBCUTANEOUS at 08:03

## 2024-03-16 RX ADMIN — SODIUM CHLORIDE 1000 ML: 9 INJECTION, SOLUTION INTRAVENOUS at 08:03

## 2024-03-16 NOTE — ED NOTES
"Pt removed IV from arm stating that "I feel like I'm about to pass out". Bleeding controlled and arm bandaged. Normal saline infusion incomplete. Dr. Yoo notified. [place] police officers at bedside.   "

## 2024-03-16 NOTE — ED TRIAGE NOTES
"Pt BIB EMS accompanied by 2 [place] police officers from [facility] for evaluation and tx of drug overdose s/p being found in [facility] cell with pinpoint pupils and not breathing. Pt is handcuffed and in bed with BP cuff and pulse oximetry monitoring applied. Pt is AAOx4, calm and cooperative and reporting feeling "thirsty". Pt denies any n/v/d, SOB or chest pain. Pmhx of opiate addiction and recently dx with [place] Palsy.  "

## 2024-03-16 NOTE — DISCHARGE INSTRUCTIONS
Please check your blood sugar before you take your prednisone, if your blood sugar is greater than 250, do not take your prednisone for the day.  Valacyclovir as directed.  Please use the artificial tears every 4-6 hours.  Please tape or patch your eye shut at night.  Please follow up with the neurologist above.  Return immediately if you get worse or if new problems develop.

## 2024-03-16 NOTE — ED PROVIDER NOTES
Encounter Date: 3/16/2024    SCRIBE #1 NOTE: I, Betsy Pena, am scribing for, and in the presence of,  Rory Yoo MD. I have scribed the following portions of the note - Other sections scribed: HPI, ROS, PE, MDM.       History     Chief Complaint   Patient presents with    Drug Overdose     EMS called to 32yo male that was found in half-way cell with pinpoint pupils and not breathing. He was being bagged when Ems arrived. Given 0.5mg narcan IV with positive response. Officers found white rocks in socks. Seen here for same issue yesterday.     This 33 y.o male, with a medical history of Opiate addiction, presents to the ED via EMS transportation in police custody for drug overdose. Per EMS, pt was found in half-way cell with pinpoint pupils and not breathing. EMS notes that he was given 0.5 mg of Narcan for treatment with improvement. Pt was seen in this ED yesterday for the same symptoms, noting that he passed out after using drugs. He denies any drug use this morning. Pt notes that he was clean for a while, but began snorting heroin again 1 week ago. He reports that he has been experiencing weakness to the right face and right hand since yesterday and was diagnosed with Bell's Palsy while in this ED. He was prescribed medications, but has been unable to take anything while in custody. He notes that he has also been experiencing generalized weakness (feels as though he is going to pass out), a headache and watering of the right eye. No recent injury to neck or shoulders. No history of IV drug use. Pt denies fever, vision changes or any other associated symptoms.    The history is provided by the patient.     Review of patient's allergies indicates:  No Known Allergies  Past Medical History:   Diagnosis Date    Opiate addiction      Past Surgical History:   Procedure Laterality Date    MANDIBLE FRACTURE SURGERY      MANDIBLE FRACTURE SURGERY       History reviewed. No pertinent family history.  Social History      Tobacco Use    Smoking status: Light Smoker     Types: Cigarettes    Smokeless tobacco: Never   Substance Use Topics    Alcohol use: Not Currently    Drug use: Yes     Types: Marijuana     Comment: opiates and fentanyl     Review of Systems   Constitutional:  Negative for fever.        (+) drug overdose   HENT:  Negative for sore throat.    Eyes:  Negative for visual disturbance.        (+) watering of right eye   Respiratory:  Negative for shortness of breath.    Cardiovascular:  Negative for chest pain.   Gastrointestinal:  Negative for nausea.   Genitourinary:  Negative for dysuria.   Musculoskeletal:  Negative for back pain.   Skin:  Negative for rash.   Neurological:  Positive for weakness (generalized as well as to the right face and right hand) and headaches.       Physical Exam     Initial Vitals [03/16/24 0708]   BP Pulse Resp Temp SpO2   (!) 140/80 100 18 98.3 °F (36.8 °C) 100 %      MAP       --         Physical Exam    Nursing note and vitals reviewed.  Constitutional: He appears well-developed and well-nourished. He is not diaphoretic. No distress.   HENT:   Head: Normocephalic and atraumatic.   There is a little erythema and retraction to the right ear drum.    Eyes: Conjunctivae are normal.   Neck: Neck supple.   Cardiovascular:  Normal rate and regular rhythm.           Pulmonary/Chest: Breath sounds normal. No respiratory distress.   Abdominal: Abdomen is soft. There is no abdominal tenderness.   Musculoskeletal:         General: No edema.      Cervical back: Neck supple.     Neurological: He is alert and oriented to person, place, and time.   Equal eyebrow raise bilaterally. Decreased eye closing on right. There is also decrease in corner of mouth raise on right. Weakness to dorsiflexion of right wrist with normal  strength, normal interosseous strength and normal bicep and triceps strength.    Skin: Skin is warm and dry.   Psychiatric: He has a normal mood and affect.         ED Course    Procedures  Labs Reviewed   CBC W/ AUTO DIFFERENTIAL - Abnormal; Notable for the following components:       Result Value    WBC 16.25 (*)     RBC 3.98 (*)     Hemoglobin 12.4 (*)     Hematocrit 36.5 (*)     MCH 31.2 (*)     Gran # (ANC) 13.9 (*)     Immature Grans (Abs) 0.05 (*)     Lymph # 0.9 (*)     Mono # 1.4 (*)     Gran % 85.6 (*)     Lymph % 5.2 (*)     All other components within normal limits   COMPREHENSIVE METABOLIC PANEL - Abnormal; Notable for the following components:    Sodium 135 (*)     Glucose 137 (*)     Calcium 8.5 (*)     Anion Gap 7 (*)     All other components within normal limits   POCT GLUCOSE - Abnormal; Notable for the following components:    POCT Glucose 120 (*)     All other components within normal limits   CULTURE, BLOOD   CULTURE, BLOOD   LACTIC ACID, PLASMA   CK   CK          Imaging Results              MRI Brain W WO Contrast (Final result)  Result time 03/16/24 12:22:40      Final result by Tony Arreguin DO (03/16/24 12:22:40)                   Impression:      Allowing for motion artifacts no evidence for acute infarction or hydrocephalus.    There is partially empty sella with slight prominent fluid signal along the optic nerve sheaths intracranial hypertension to be considered in differential in appropriate clinical setting    Clinical correlation and consideration for further evaluation with CSF analysis as warranted      Electronically signed by: Tony Arreguin DO  Date:    03/16/2024  Time:    12:22               Narrative:    EXAMINATION:  MRI BRAIN W WO CONTRAST    CLINICAL HISTORY:  Headache, new or worsening, neuro deficit (Age 19-49y);    TECHNIQUE:  Sagittal and axial T1, axial T2, axial FLAIR, axial gradient, axial diffusion imaging of the whole brain pre-contrast with postcontrast axial T1 and axial spoiled gradient imaging reformatted in the coronal and sagittal planes.. 8 ml of Gadavist injected intravenously.    COMPARISON:  CT  03/15/2024    FINDINGS:  Brain parenchyma is normal in contour.  Allowing for distortion by motion artifacts there is no restricted diffusion to suggest acute or recent infarction.  The ventricles are normal in size without hydrocephalus.  No midline shift or mass effect.  Allowing for motion distortion the major intracranial skull base T2 flow voids are grossly present    There is no abnormal parenchymal susceptibility to suggest parenchymal hemorrhage.  No definite focal parenchymal edema signal abnormality allowing for motion artifacts.  No abnormal parenchymal enhancement.  There is patchy posterior ethmoid air cell opacification.  Partially empty sella with slight prominent fluid signal along the optic nerve sheaths bilaterally allowing for motion artifact.  Intracranial hypertension to be included in differential in appropriate clinical setting                                       Medications   sodium chloride 0.9% bolus 1,000 mL 1,000 mL (0 mLs Intravenous Stopped 3/16/24 0900)   naloxone 0.4 mg/mL injection 0.4 mg (0.4 mg Intravenous Given 3/16/24 0844)   gadobutroL (GADAVIST) injection 8 mL (8 mLs Intravenous Given 3/16/24 1212)     Medical Decision Making  This is an emergent evaluation of a 33 y.o. male who presents for drug . The patient was seen and examined. The history and physical exam was obtained. The nursing notes and vital signs were reviewed. Secondary to symptoms and examination findings, I ordered labs. Will treat and reassess.     Amount and/or Complexity of Data Reviewed  Independent Historian: EMS  External Data Reviewed: labs.  Labs: ordered.  Radiology: ordered.    Risk  Prescription drug management.    Patient appears to have potential to peripheral nerve abnormalities.  A right-sided Bell's palsy and a right-sided radial nerve palsy.  Patient is also presumed to have multiple recurrent opiate overdoses requiring Narcan.  He is currently in custody while this is occurring.  Discussed  with neurology who recommends MRI of the brain just to further rule out any central cause.  There is an empty sella.  This would not be causing the symptoms.  Can follow up with Neurology as an outpatient for this.  Once again just like prior visit patient is now awake and alert and GCS of 15.  Stable for discharge and Neurology outpatient follow up.        Scribe Attestation:   Scribe #1: I performed the above scribed service and the documentation accurately describes the services I performed. I attest to the accuracy of the note.                         I, Luis A Yoo, personally performed the services described in this documentation. All medical record entries made by the scribe were at my direction and in my presence. I have reviewed the chart and agree that the record reflects my personal performance and is accurate and complete.       Clinical Impression:  Final diagnoses:  [G51.0] Bell's palsy (Primary)  [G56.31] Radial nerve palsy, right  [H66.91] Right otitis media, unspecified otitis media type  [E23.6] Empty sella  [F11.10] Opiate abuse, continuous          ED Disposition Condition    Discharge Stable          ED Prescriptions       Medication Sig Dispense Start Date End Date Auth. Provider    predniSONE (DELTASONE) 10 MG tablet  (Status: Discontinued) Po qd: 6 tabs x 5d; 5 tabs x 2d; 4 tabs x 2d; 3 tabs x 1d; 2 tabs x 1d; 1 tab x 1d; 1/2 tqab x 2d 55 tablet 3/16/2024 3/16/2024 Luis A Yoo MD    valACYclovir (VALTREX) 1000 MG tablet  (Status: Discontinued) Take 1 tablet (1,000 mg total) by mouth 3 (three) times daily. for 7 days 21 tablet 3/16/2024 3/16/2024 Luis A Yoo MD    amoxicillin-clavulanate 875-125mg (AUGMENTIN) 875-125 mg per tablet  (Status: Discontinued) Take 1 tablet by mouth 2 (two) times daily. for 10 days 20 tablet 3/16/2024 3/16/2024 Luis A Yoo MD    amoxicillin-clavulanate 875-125mg (AUGMENTIN) 875-125 mg per tablet () Take 1 tablet by mouth 2  (two) times daily. for 10 days 20 tablet 3/16/2024 3/26/2024 Rory Yoo MD    predniSONE (DELTASONE) 10 MG tablet Po qd: 6 tabs x 5d; 5 tabs x 2d; 4 tabs x 2d; 3 tabs x 1d; 2 tabs x 1d; 1 tab x 1d; 1/2 tqab x 2d 55 tablet 3/16/2024 -- Rory Yoo MD    valACYclovir (VALTREX) 1000 MG tablet () Take 1 tablet (1,000 mg total) by mouth 3 (three) times daily. for 7 days 21 tablet 3/16/2024 3/23/2024 Rory Yoo MD          Follow-up Information       Follow up With Specialties Details Why Contact Info    Brendon Melchor MD Neurology Schedule an appointment as soon as possible for a visit   [address]  [place] [address]  [phone]               Rory Yoo MD  [phone]

## 2024-03-17 LAB
OHS QRS DURATION: 108 MS
OHS QTC CALCULATION: 467 MS

## 2024-03-20 LAB
BACTERIA BLD CULT: NORMAL
BACTERIA BLD CULT: NORMAL